# Patient Record
Sex: MALE | Race: WHITE | Employment: FULL TIME | URBAN - NONMETROPOLITAN AREA
[De-identification: names, ages, dates, MRNs, and addresses within clinical notes are randomized per-mention and may not be internally consistent; named-entity substitution may affect disease eponyms.]

---

## 2020-09-13 ENCOUNTER — HOSPITAL ENCOUNTER (EMERGENCY)
Age: 45
Discharge: ANOTHER HOSPITAL | End: 2020-09-13
Attending: EMERGENCY MEDICINE
Payer: COMMERCIAL

## 2020-09-13 ENCOUNTER — HOSPITAL ENCOUNTER (OUTPATIENT)
Age: 45
Setting detail: OBSERVATION
Discharge: HOME | End: 2020-09-14
Attending: INTERNAL MEDICINE | Admitting: INTERNAL MEDICINE
Payer: COMMERCIAL

## 2020-09-13 ENCOUNTER — APPOINTMENT (OUTPATIENT)
Dept: GENERAL RADIOLOGY | Age: 45
End: 2020-09-13
Payer: COMMERCIAL

## 2020-09-13 VITALS
BODY MASS INDEX: 29.35 KG/M2 | RESPIRATION RATE: 10 BRPM | HEART RATE: 107 BPM | SYSTOLIC BLOOD PRESSURE: 123 MMHG | OXYGEN SATURATION: 100 % | HEIGHT: 70 IN | TEMPERATURE: 97.8 F | WEIGHT: 205 LBS | DIASTOLIC BLOOD PRESSURE: 76 MMHG

## 2020-09-13 PROBLEM — I20.0 UNSTABLE ANGINA (HCC): Status: ACTIVE | Noted: 2020-09-13

## 2020-09-13 LAB
A/G RATIO: 2 (ref 1.1–2.2)
ALBUMIN SERPL-MCNC: 4.5 G/DL (ref 3.4–5)
ALP BLD-CCNC: 77 U/L (ref 40–129)
ALT SERPL-CCNC: 17 U/L (ref 10–40)
ANION GAP SERPL CALCULATED.3IONS-SCNC: 9 MMOL/L (ref 3–16)
APTT: 30.5 SEC (ref 24.2–36.2)
APTT: 41.7 SEC (ref 24.2–36.2)
APTT: 45.9 SEC (ref 24.2–36.2)
APTT: 56.6 SEC (ref 24.2–36.2)
AST SERPL-CCNC: 18 U/L (ref 15–37)
BASOPHILS ABSOLUTE: 0.1 K/UL (ref 0–0.2)
BASOPHILS RELATIVE PERCENT: 0.6 %
BILIRUB SERPL-MCNC: 0.4 MG/DL (ref 0–1)
BUN BLDV-MCNC: 14 MG/DL (ref 7–20)
CALCIUM SERPL-MCNC: 8.9 MG/DL (ref 8.3–10.6)
CHLORIDE BLD-SCNC: 106 MMOL/L (ref 99–110)
CO2: 27 MMOL/L (ref 21–32)
CREAT SERPL-MCNC: 0.9 MG/DL (ref 0.9–1.3)
EKG ATRIAL RATE: 81 BPM
EKG ATRIAL RATE: 81 BPM
EKG ATRIAL RATE: 85 BPM
EKG DIAGNOSIS: NORMAL
EKG P AXIS: 29 DEGREES
EKG P AXIS: 31 DEGREES
EKG P AXIS: 60 DEGREES
EKG P-R INTERVAL: 144 MS
EKG P-R INTERVAL: 150 MS
EKG P-R INTERVAL: 158 MS
EKG Q-T INTERVAL: 376 MS
EKG Q-T INTERVAL: 384 MS
EKG Q-T INTERVAL: 392 MS
EKG QRS DURATION: 92 MS
EKG QRS DURATION: 94 MS
EKG QRS DURATION: 98 MS
EKG QTC CALCULATION (BAZETT): 436 MS
EKG QTC CALCULATION (BAZETT): 446 MS
EKG QTC CALCULATION (BAZETT): 466 MS
EKG R AXIS: 54 DEGREES
EKG R AXIS: 56 DEGREES
EKG R AXIS: 62 DEGREES
EKG T AXIS: 49 DEGREES
EKG T AXIS: 51 DEGREES
EKG T AXIS: 53 DEGREES
EKG VENTRICULAR RATE: 81 BPM
EKG VENTRICULAR RATE: 81 BPM
EKG VENTRICULAR RATE: 85 BPM
EOSINOPHILS ABSOLUTE: 0.2 K/UL (ref 0–0.6)
EOSINOPHILS RELATIVE PERCENT: 2.3 %
GFR AFRICAN AMERICAN: >60
GFR NON-AFRICAN AMERICAN: >60
GLOBULIN: 2.3 G/DL
GLUCOSE BLD-MCNC: 108 MG/DL (ref 70–99)
GLUCOSE BLD-MCNC: 97 MG/DL (ref 70–99)
HCT VFR BLD CALC: 44.5 % (ref 40.5–52.5)
HEMOGLOBIN: 14.9 G/DL (ref 13.5–17.5)
LYMPHOCYTES ABSOLUTE: 2.4 K/UL (ref 1–5.1)
LYMPHOCYTES RELATIVE PERCENT: 27.2 %
MCH RBC QN AUTO: 29.3 PG (ref 26–34)
MCHC RBC AUTO-ENTMCNC: 33.6 G/DL (ref 31–36)
MCV RBC AUTO: 87.3 FL (ref 80–100)
MONOCYTES ABSOLUTE: 0.7 K/UL (ref 0–1.3)
MONOCYTES RELATIVE PERCENT: 8.5 %
NEUTROPHILS ABSOLUTE: 5.4 K/UL (ref 1.7–7.7)
NEUTROPHILS RELATIVE PERCENT: 61.4 %
PDW BLD-RTO: 13.6 % (ref 12.4–15.4)
PERFORMED ON: NORMAL
PLATELET # BLD: 233 K/UL (ref 135–450)
PMV BLD AUTO: 9.2 FL (ref 5–10.5)
POTASSIUM REFLEX MAGNESIUM: 4.2 MMOL/L (ref 3.5–5.1)
RBC # BLD: 5.1 M/UL (ref 4.2–5.9)
SODIUM BLD-SCNC: 142 MMOL/L (ref 136–145)
TOTAL PROTEIN: 6.8 G/DL (ref 6.4–8.2)
TROPONIN: <0.01 NG/ML
WBC # BLD: 8.8 K/UL (ref 4–11)

## 2020-09-13 PROCEDURE — 6360000002 HC RX W HCPCS: Performed by: INTERNAL MEDICINE

## 2020-09-13 PROCEDURE — 80053 COMPREHEN METABOLIC PANEL: CPT

## 2020-09-13 PROCEDURE — 99291 CRITICAL CARE FIRST HOUR: CPT

## 2020-09-13 PROCEDURE — 6370000000 HC RX 637 (ALT 250 FOR IP): Performed by: EMERGENCY MEDICINE

## 2020-09-13 PROCEDURE — 93010 ELECTROCARDIOGRAM REPORT: CPT | Performed by: INTERNAL MEDICINE

## 2020-09-13 PROCEDURE — 36415 COLL VENOUS BLD VENIPUNCTURE: CPT

## 2020-09-13 PROCEDURE — 99223 1ST HOSP IP/OBS HIGH 75: CPT | Performed by: INTERNAL MEDICINE

## 2020-09-13 PROCEDURE — 84484 ASSAY OF TROPONIN QUANT: CPT

## 2020-09-13 PROCEDURE — 2580000003 HC RX 258: Performed by: EMERGENCY MEDICINE

## 2020-09-13 PROCEDURE — 85730 THROMBOPLASTIN TIME PARTIAL: CPT

## 2020-09-13 PROCEDURE — 2060000000 HC ICU INTERMEDIATE R&B

## 2020-09-13 PROCEDURE — 85025 COMPLETE CBC W/AUTO DIFF WBC: CPT

## 2020-09-13 PROCEDURE — 2500000003 HC RX 250 WO HCPCS: Performed by: INTERNAL MEDICINE

## 2020-09-13 PROCEDURE — 6370000000 HC RX 637 (ALT 250 FOR IP): Performed by: INTERNAL MEDICINE

## 2020-09-13 PROCEDURE — 96374 THER/PROPH/DIAG INJ IV PUSH: CPT

## 2020-09-13 PROCEDURE — 71045 X-RAY EXAM CHEST 1 VIEW: CPT

## 2020-09-13 PROCEDURE — 6360000002 HC RX W HCPCS: Performed by: EMERGENCY MEDICINE

## 2020-09-13 PROCEDURE — G0378 HOSPITAL OBSERVATION PER HR: HCPCS

## 2020-09-13 PROCEDURE — 2580000003 HC RX 258: Performed by: INTERNAL MEDICINE

## 2020-09-13 PROCEDURE — G0379 DIRECT REFER HOSPITAL OBSERV: HCPCS

## 2020-09-13 PROCEDURE — 93005 ELECTROCARDIOGRAM TRACING: CPT | Performed by: EMERGENCY MEDICINE

## 2020-09-13 PROCEDURE — 84443 ASSAY THYROID STIM HORMONE: CPT

## 2020-09-13 RX ORDER — HEPARIN SODIUM 10000 [USP'U]/100ML
11.3 INJECTION, SOLUTION INTRAVENOUS CONTINUOUS
Status: DISCONTINUED | OUTPATIENT
Start: 2020-09-13 | End: 2020-09-14

## 2020-09-13 RX ORDER — HEPARIN SODIUM 1000 [USP'U]/ML
4000 INJECTION, SOLUTION INTRAVENOUS; SUBCUTANEOUS ONCE
Status: COMPLETED | OUTPATIENT
Start: 2020-09-13 | End: 2020-09-13

## 2020-09-13 RX ORDER — ONDANSETRON 2 MG/ML
4 INJECTION INTRAMUSCULAR; INTRAVENOUS EVERY 6 HOURS PRN
Status: DISCONTINUED | OUTPATIENT
Start: 2020-09-13 | End: 2020-09-14 | Stop reason: HOSPADM

## 2020-09-13 RX ORDER — ASPIRIN 81 MG/1
324 TABLET, CHEWABLE ORAL ONCE
Status: COMPLETED | OUTPATIENT
Start: 2020-09-13 | End: 2020-09-13

## 2020-09-13 RX ORDER — HEPARIN SODIUM 1000 [USP'U]/ML
2000 INJECTION, SOLUTION INTRAVENOUS; SUBCUTANEOUS PRN
Status: DISCONTINUED | OUTPATIENT
Start: 2020-09-13 | End: 2020-09-13 | Stop reason: HOSPADM

## 2020-09-13 RX ORDER — ACETAMINOPHEN 650 MG/1
650 SUPPOSITORY RECTAL EVERY 6 HOURS PRN
Status: DISCONTINUED | OUTPATIENT
Start: 2020-09-13 | End: 2020-09-14 | Stop reason: HOSPADM

## 2020-09-13 RX ORDER — ASPIRIN 81 MG/1
81 TABLET, CHEWABLE ORAL DAILY
Status: DISCONTINUED | OUTPATIENT
Start: 2020-09-14 | End: 2020-09-14

## 2020-09-13 RX ORDER — ATORVASTATIN CALCIUM 40 MG/1
40 TABLET, FILM COATED ORAL NIGHTLY
Status: DISCONTINUED | OUTPATIENT
Start: 2020-09-13 | End: 2020-09-14

## 2020-09-13 RX ORDER — NITROGLYCERIN 0.4 MG/1
0.4 TABLET SUBLINGUAL ONCE
Status: COMPLETED | OUTPATIENT
Start: 2020-09-13 | End: 2020-09-13

## 2020-09-13 RX ORDER — PANTOPRAZOLE SODIUM 40 MG/1
40 TABLET, DELAYED RELEASE ORAL
Status: DISCONTINUED | OUTPATIENT
Start: 2020-09-14 | End: 2020-09-13

## 2020-09-13 RX ORDER — POLYETHYLENE GLYCOL 3350 17 G/17G
17 POWDER, FOR SOLUTION ORAL DAILY PRN
Status: DISCONTINUED | OUTPATIENT
Start: 2020-09-13 | End: 2020-09-14 | Stop reason: HOSPADM

## 2020-09-13 RX ORDER — HEPARIN SODIUM 1000 [USP'U]/ML
2000 INJECTION, SOLUTION INTRAVENOUS; SUBCUTANEOUS PRN
Status: DISCONTINUED | OUTPATIENT
Start: 2020-09-13 | End: 2020-09-14

## 2020-09-13 RX ORDER — PANTOPRAZOLE SODIUM 40 MG/1
40 TABLET, DELAYED RELEASE ORAL
Status: DISCONTINUED | OUTPATIENT
Start: 2020-09-13 | End: 2020-09-14 | Stop reason: HOSPADM

## 2020-09-13 RX ORDER — HEPARIN SODIUM 10000 [USP'U]/100ML
9.7 INJECTION, SOLUTION INTRAVENOUS CONTINUOUS
Status: DISCONTINUED | OUTPATIENT
Start: 2020-09-13 | End: 2020-09-13 | Stop reason: HOSPADM

## 2020-09-13 RX ORDER — PROMETHAZINE HYDROCHLORIDE 25 MG/1
12.5 TABLET ORAL EVERY 6 HOURS PRN
Status: DISCONTINUED | OUTPATIENT
Start: 2020-09-13 | End: 2020-09-14 | Stop reason: HOSPADM

## 2020-09-13 RX ORDER — HEPARIN SODIUM 1000 [USP'U]/ML
4000 INJECTION, SOLUTION INTRAVENOUS; SUBCUTANEOUS PRN
Status: DISCONTINUED | OUTPATIENT
Start: 2020-09-13 | End: 2020-09-13 | Stop reason: HOSPADM

## 2020-09-13 RX ORDER — HEPARIN SODIUM 1000 [USP'U]/ML
4000 INJECTION, SOLUTION INTRAVENOUS; SUBCUTANEOUS PRN
Status: DISCONTINUED | OUTPATIENT
Start: 2020-09-13 | End: 2020-09-14

## 2020-09-13 RX ORDER — SUCRALFATE 1 G/1
1 TABLET ORAL EVERY 6 HOURS SCHEDULED
Status: DISCONTINUED | OUTPATIENT
Start: 2020-09-13 | End: 2020-09-14 | Stop reason: HOSPADM

## 2020-09-13 RX ORDER — 0.9 % SODIUM CHLORIDE 0.9 %
500 INTRAVENOUS SOLUTION INTRAVENOUS ONCE
Status: COMPLETED | OUTPATIENT
Start: 2020-09-13 | End: 2020-09-13

## 2020-09-13 RX ORDER — SODIUM CHLORIDE 0.9 % (FLUSH) 0.9 %
10 SYRINGE (ML) INJECTION PRN
Status: DISCONTINUED | OUTPATIENT
Start: 2020-09-13 | End: 2020-09-14 | Stop reason: HOSPADM

## 2020-09-13 RX ORDER — NITROGLYCERIN 0.4 MG/1
0.4 TABLET SUBLINGUAL EVERY 5 MIN PRN
Status: DISCONTINUED | OUTPATIENT
Start: 2020-09-13 | End: 2020-09-14 | Stop reason: HOSPADM

## 2020-09-13 RX ORDER — HEPARIN SODIUM 1000 [USP'U]/ML
2000 INJECTION, SOLUTION INTRAVENOUS; SUBCUTANEOUS ONCE
Status: COMPLETED | OUTPATIENT
Start: 2020-09-13 | End: 2020-09-13

## 2020-09-13 RX ORDER — ACETAMINOPHEN 325 MG/1
650 TABLET ORAL EVERY 6 HOURS PRN
Status: DISCONTINUED | OUTPATIENT
Start: 2020-09-13 | End: 2020-09-14 | Stop reason: HOSPADM

## 2020-09-13 RX ORDER — SODIUM CHLORIDE 0.9 % (FLUSH) 0.9 %
10 SYRINGE (ML) INJECTION EVERY 12 HOURS SCHEDULED
Status: DISCONTINUED | OUTPATIENT
Start: 2020-09-13 | End: 2020-09-14 | Stop reason: HOSPADM

## 2020-09-13 RX ADMIN — SODIUM CHLORIDE, PRESERVATIVE FREE 10 ML: 5 INJECTION INTRAVENOUS at 20:38

## 2020-09-13 RX ADMIN — SUCRALFATE 1 G: 1 TABLET ORAL at 14:08

## 2020-09-13 RX ADMIN — PANTOPRAZOLE SODIUM 40 MG: 40 TABLET, DELAYED RELEASE ORAL at 14:08

## 2020-09-13 RX ADMIN — ATORVASTATIN CALCIUM 40 MG: 40 TABLET, FILM COATED ORAL at 20:38

## 2020-09-13 RX ADMIN — ASPIRIN 324 MG: 81 TABLET, CHEWABLE ORAL at 06:10

## 2020-09-13 RX ADMIN — NITROGLYCERIN 0.4 MG: 0.4 TABLET SUBLINGUAL at 06:10

## 2020-09-13 RX ADMIN — LIDOCAINE HYDROCHLORIDE: 20 SOLUTION ORAL; TOPICAL at 14:08

## 2020-09-13 RX ADMIN — SUCRALFATE 1 G: 1 TABLET ORAL at 23:30

## 2020-09-13 RX ADMIN — SUCRALFATE 1 G: 1 TABLET ORAL at 17:44

## 2020-09-13 RX ADMIN — HEPARIN SODIUM 4000 UNITS: 1000 INJECTION INTRAVENOUS; SUBCUTANEOUS at 07:48

## 2020-09-13 RX ADMIN — SODIUM CHLORIDE 500 ML: 9 INJECTION, SOLUTION INTRAVENOUS at 07:15

## 2020-09-13 RX ADMIN — HEPARIN SODIUM 9.7 ML/HR: 10000 INJECTION, SOLUTION INTRAVENOUS at 07:49

## 2020-09-13 RX ADMIN — HEPARIN SODIUM 2000 UNITS: 1000 INJECTION INTRAVENOUS; SUBCUTANEOUS at 16:39

## 2020-09-13 RX ADMIN — HEPARIN SODIUM 9.7 ML/HR: 10000 INJECTION, SOLUTION INTRAVENOUS at 10:51

## 2020-09-13 ASSESSMENT — ENCOUNTER SYMPTOMS
SORE THROAT: 0
NAUSEA: 0
ABDOMINAL PAIN: 0
BACK PAIN: 0
VOMITING: 0
EYE DISCHARGE: 0
SHORTNESS OF BREATH: 1
DIARRHEA: 0
COUGH: 0
CHEST TIGHTNESS: 1

## 2020-09-13 ASSESSMENT — PAIN DESCRIPTION - PAIN TYPE: TYPE: ACUTE PAIN

## 2020-09-13 ASSESSMENT — PAIN DESCRIPTION - ORIENTATION: ORIENTATION: MID

## 2020-09-13 ASSESSMENT — PAIN SCALES - GENERAL
PAINLEVEL_OUTOF10: 1
PAINLEVEL_OUTOF10: 0

## 2020-09-13 ASSESSMENT — PAIN DESCRIPTION - LOCATION: LOCATION: CHEST

## 2020-09-13 ASSESSMENT — HEART SCORE: ECG: 2

## 2020-09-13 ASSESSMENT — PAIN DESCRIPTION - DESCRIPTORS: DESCRIPTORS: OTHER (COMMENT)

## 2020-09-13 NOTE — ED TRIAGE NOTES
Pt states last weekend he was having mid chest pain that he felt like was effecting his breathing. When he would sit and rest the feeling would go away. Today he woke up with more mid chest pain and feeling like his forehead was hot.

## 2020-09-13 NOTE — H&P
Hospital Medicine History & Physical      PCP: Damian Hu MD    Date of Admission: 2020    Date of Service: Pt seen/examined on 20  and Admitted to Inpatient with expected LOS greater than two midnights due to medical therapy. Chief Complaint:   Directly admitted from [facility] ED for further evaluation of chest tightness, shortness of breath on exertion    History Of Present Illness:   54 y.o. male with no significant PMH except for GERD  who presented to [facility] ED  with C/o chest tightness associated with mild shortness of breath on exertion. Patient reports that his symptoms has been intermittently ongoing for the past 1 week. He described chest tightness as squeezing sensation, nonradiating, no aggravating or relieving factors. Denies smoking, drinking, illicit drug use. Reports mom has history of cardiomegaly and grandfather  of heart attack. Reports that he does not exercise as much as he wants. He went to [facility]  ED for further evaluation and management. [facility] ED Course : Patient hemodynamically stable upon arrival to ED. His initial troponin negative and EKG showed mild T wave inversions in V1 to V3 and ST depressions in V2 V3. Cardiology was consulted from ED. While in ED, patient had a syncopal event with bradycardia (reported heart rate in 20s) . Patient reports that he was nervous after receiving saline flush, which could have contributed to that. Reports that he  had  similar event in the past.  Patient started on heparin gtt. Cardiology consulted and was recommended admission for further evaluation and management. [facility] hospitalists consulted for admission. Upon evaluation by me at [facility], patient accompanied with his significant other at bedside. Reports mild chest pressure-like sensation at one point near the left nipple. Denied any other symptoms. Reported that his troponin trend has been negative.     Past Medical texture, turgor normal.  No rashes or lesions. Neurologic:  Neurovascularly intact without any focal sensory/motor deficits. Cranial nerves: II-XII intact, grossly non-focal.  Psychiatric:  Alert and oriented, thought content appropriate, normal insight  Capillary Refill: Brisk,< 3 seconds   Peripheral Pulses: +2 palpable, equal bilaterally     Labs:   Recent Labs     09/13/20 0605   WBC 8.8   HGB 14.9   HCT 44.5        Recent Labs     09/13/20 0605      K 4.2      CO2 27   BUN 14   CREATININE 0.9   CALCIUM 8.9     Recent Labs     09/13/20 0605   AST 18   ALT 17   BILITOT 0.4   ALKPHOS 77     No results for input(s): INR in the last 72 hours. Recent Labs     09/13/20 0605   TROPONINI <0.01     EKG:  I have reviewed the EKG with the following interpretation: Normal sinus rhythm with sinus arrhythmia, normal axis, normal intervals, nonspecific  ST-T wave changes    Radiology:    I have reviewed the Imaging  with the following interpretation:   Chest x-ray  - no Acute abnormality    Active Hospital Problems    Diagnosis Date Noted    Unstable angina ([address] [I20.0] 09/13/2020     ASSESSMENT/PLAN:  1. Chest pain with SOBOE concerning to ED for unstable angina POA  -Received aspirin, nitro PRN, EKG with mild ST depressions in V2 to V3 and T wave inversions from V1 to V3. Cardiology consulted from [facility] ED and patient was  recommended admission for further evaluation and management. Patient initiated on heparin GTT at [facility]  ED prior to transfer. Continue   -Admit to [facility] with telemetry, trend troponin, check FLP, HbA1c.  -NPO after MN . Cardiac diet     2. GERD - On PPI ; Continue    3. Obesity -  With Body mass index is 30.58 kg/m². Complicating assessment and treatment. Placing patient at risk for multiple co-morbidities as well as early death and contributing to the patient's presentation. Counseled on weight loss.      DVT Prophylaxis: Heparin GTT  Diet: Diet NPO Effective Now  Code Status: Full Code    PT/OT Eval Status: Ambulatory    Dispo -anticipate more than 2 midnight stay in the hospital     Morena [facility] MD    The note was completed using Dragon -speech recognition software & EMR  . Every effort was made to ensure accuracy; however, inadvertent computerized transcription errors may be present. Thank you Ernie Powell MD for the opportunity to be involved in this patient's care. If you have any questions or concerns please feel free to contact me at [phone].

## 2020-09-13 NOTE — ED NOTES
Noted dramatic decrease in pt heart rate on monitor NSR to SB 20s. Upon arrival to room pt was unconscious and non-responsive. Several seconds after staff arrived pt started to wake up, unsure of what had happened. Pt moved to room 1, pacer pads placed; HR returned to NSR. Pt stable and responsive at this time, sister at bedside and wife updated via phone. Denies further needs at this time, will continue to monitor. Loreta Leal RN.        Christopher Alvarez RN  09/13/20 0700

## 2020-09-13 NOTE — CONSULTS
Electrophysiology Consultation   Date: 9/13/2020  Admit Date:  9/13/2020  Reason for Consultation: Chest pain  Consult Requesting Physician: Rosalio Espinoza MD     No chief complaint on file. HPI:   Mr. Jarrett Meyer is a pleasant 54year old male with a medical history significant for newly diagnosed hypertension and GERD who presents from home with acute on chronic chest pain now complicated by bradycardia. According patient has been suffering from mid sternal chest pain for the last few months. Over the last few weeks he is noted that the chest discomfort has acutely worsened. He states that standing up and walking around usually tends to make it feel better. He states that laying on his side tends to aggravate it. It does not occur when he is upright and walking around. There is no radiation of his chest discomfort. There is no associated nausea or vomiting or diaphoresis. Because his discomfort was aggressively worsening he went to the emergency room for evaluation. Patient denies fevers, orthopnea, PND, lower extremity edema, abdominal swelling, shortness of breath, dyspnea on exertion, chills, visual changes, headaches, sore throat, cough, abdominal pain, nausea, vomiting, bleeding, bruising, dysuria, muscle/joint pain, confusion, depression, anxiety, skin lesions, etc.    Of note he has no history of syncope prior to his evaluation emergency room. Emergency Room/Hospital Course:  Patient was evaluated in the emergency room. During his work-up he got some sublingual nitroglycerin which did not help his pain and him bolus of saline. Shortly thereafter patient had a syncopal episode. Telemetry showed recurrent pauses longest being approximately 5.2 seconds. His EKG outside of this was normal except for ST inversion and borderline depression in his anterior leads. His troponin enzymes have been negative x3. His CMP was reassuring. His CBC was reassuring.   His chest radiograph is reassuring. Past Medical History:   Diagnosis Date    GERD (gastroesophageal reflux disease)     Perforated appendicitis         Past Surgical History:   Procedure Laterality Date    APPENDECTOMY  3/2/2016    Laparoscopic       Allergies   Allergen Reactions    Pcn [Penicillins] Hives       Social History:  Reviewed. reports that he has never smoked. He has never used smokeless tobacco. He reports that he does not drink alcohol or use drugs. Family History:  Reviewed. family history includes Cancer in his mother; Heart Disease in his mother. No premature CAD. Review of System:  All other systems reviewed except for that noted above. Pertinent negatives and positives are:     · General: negative for fever, chills   · Ophthalmic ROS: negative for - eye pain or loss of vision  · ENT ROS: negative for - headaches, sore throat   · Respiratory: negative for - cough, sputum  · Cardiovascular: Reviewed in HPI  · Gastrointestinal: negative for - abdominal pain, diarrhea, N/V  · Hematology: negative for - bleeding, blood clots, bruising or jaundice  · Genito-Urinary:  negative for - Dysuria or incontinence  · Musculoskeletal: negative for - Joint swelling, muscle pain  · Neurological: negative for - confusion, dizziness, headaches   · Psychiatric: No anxiety, no depression.   · Dermatological: negative for - rash    Physical Examination:  Vitals:    20 1126   BP: 137/87   Pulse: 89   Resp: 16   Temp: 98.5 °F (36.9 °C)   SpO2: 99%        Intake/Output Summary (Last 24 hours) at 2020 1312  Last data filed at 2020 1020  Gross per 24 hour   Intake --   Output 200 ml   Net -200 ml     In: -   Out: 200    Wt Readings from Last 3 Encounters:   20 213 lb 2 oz (96.7 kg)   20 205 lb (93 kg)   18 205 lb (93 kg)     Temp  Av.2 °F (36.8 °C)  Min: 97.8 °F (36.6 °C)  Max: 98.5 °F (36.9 °C)  Pulse  Av.5  Min: 85  Max: 107  BP  Min: 98/63  Max: 152/99  SpO2  Av.4 %  Min: 97 % Max: 100 %    · Telemetry: Sinus rhythm. Bradycardia with syncope noted on strips from ER. · Constitutional: Alert. Oriented to person, place, and time. No distress. · Head: Normocephalic and atraumatic. · Mouth/Throat: Lips appear moist. Oropharynx is clear and moist.   · Cardiovascular: Normal rate, regular rhythm. Normal S1&S2. · Pulmonary/Chest: Bilateral respiratory sounds present. No respiratory accessory muscle use. No wheezes, No rhonchi. · Abdominal: Soft. Normal bowel sounds present. No distension, No tenderness. No splenomegaly. No hernia. · Musculoskeletal: No tenderness. No edema    · Neurological: Alert and oriented. Cranial nerve II-XII grossly intact. · Skin: Skin is warm and dry. No rash, lesions, ulcerations noted. · Psychiatric: No anxiety nor agitation. Labs:  Reviewed. Recent Labs     09/13/20  0605      K 4.2      CO2 27   BUN 14   CREATININE 0.9     Recent Labs     09/13/20  0605   WBC 8.8   HGB 14.9   HCT 44.5   MCV 87.3        Lab Results   Component Value Date    TROPONINI <0.01 09/13/2020     No results found for: BNP  No results found for: PROTIME, INR  No results found for: CHOL, HDL, TRIG    Diagnostic and imaging results reviewed. ECG: Sinus rhythm with TW inversions and borderline depression in anterior leads. I independently reviewed the ECG and telemetry.     Scheduled Meds:   sodium chloride flush  10 mL Intravenous 2 times per day    atorvastatin  40 mg Oral Nightly    [START ON 9/14/2020] aspirin  81 mg Oral Daily    [START ON 9/14/2020] pantoprazole  40 mg Oral QAM AC     Continuous Infusions:   heparin (Porcine) 9.7 mL/hr (09/13/20 1051)     PRN Meds:.sodium chloride flush, acetaminophen **OR** acetaminophen, polyethylene glycol, promethazine **OR** ondansetron, heparin (porcine), heparin (porcine), nitroGLYCERIN     Assessment:   Patient Active Problem List    Diagnosis Date Noted    Unstable angina ([facility] [address] 09/13/2020    S/P laparoscopic appendectomy 03/21/2016    Perforated appendicitis 03/06/2016      Active Hospital Problems    Diagnosis Date Noted    Unstable angina [facility]) [I20.0] 09/13/2020       Mr. Moira Mclain is a pleasant 54year old male with a medical history significant for newly diagnosed hypertension and GERD who presents from home with acute on chronic chest pain now complicated by bradycardia. Problem List:  1. Chest pain. 2. Bradycardia/syncope. Assessment and Plan:  1. Chest pain. Patient is a pleasant 48-year-old male with a medical history significant for hypertension and GERD who presents from home with acute on chronic chest pain that is been worsening over the last few weeks. His troponin enzymes are negative despite continued to have chest discomfort. Nitroglycerin did not help his chest pain. His EKG is somewhat worrisome with ST inversion and borderline depression in his anterior leads. His symptoms are not classic for typical angina. Given the fact that he had a bradycardic event and is low risk profile I think it be reasonable patient have a exercise stress test which also give us some chronotropic competence information. I also think patient benefit from wearing a monitor for 2 weeks given this event.  - NPO at midnight.  - GI cocktail.  - Start sucralfate. - Increase PPI.  - Exercise stress test in am.  - Continue ACS with aspirin and heparin. Will hold off on DAPT as I'm not convinced that his chest pain is coronary artery related. 2. Bradycardia/syncope. Patient had an episode of sinus arrest for approximately 5.2 seconds. This is was symptomatic resulting in syncope. This was also around the time when he just gotten some nitroglycerin and a bolus of saline through his IV. I suspect that this is most likely vagal in nature however it is interesting that there was no P wave slowing prior to the pause.   Given the fact is never happened before I would like to have the patient undergo a exercise stress test to see his chronotropic competency and have him wear a monitor for 2 weeks. - Exercise stress test.  - Two week monitor at time of discharge. Thank you for allowing me to participate in the care of Janet Cancer . If you have any questions/comments, please do not hesitate to contact us.     Tilda Frankel, MD  Cardiac Electrophysiology  [address] [facility]  [phone] [facility]

## 2020-09-13 NOTE — ED PROVIDER NOTES
[address]        Pt Name: Kameron Monroy  MRN: [id number]  [place] 1975  Date of evaluation: 9/13/2020  Provider: Bushra Walters MD  PCP: Ronnie Del Rosario MD  ED Attending: Bushra Walters MD    [address] [place] [address]       Chief Complaint   Patient presents with    Chest Pain       HISTORY OF PRESENT ILLNESS   (Location/Symptom, Timing/Onset, Context/Setting, Quality, Duration, Modifying Factors, Severity)  Note limiting factors. Kameron Monroy is a 54 y.o. male who presents to the emergency department with 1 out of 10 chest pain. He describes it as a squeezing sensation associated with some shortness of breath. Exertion tends to make it worse. Rest does tend to make it better. Patient states it is been off and on for the last week. Last weekend it apparently was even more significant than it is today. He has felt somewhat fatigued throughout the week as well. He also describes a sensation of not getting enough oxygen. His wife was concerned when he woke up this morning with that and so sent him to the emergency department. He does not have any family history of heart disease. No other cardiac risk factors. History is obtained from the patient. REVIEW OF SYSTEMS    (2-9 systems for level 4, 10 or more for level 5)     Review of Systems   Constitutional: Positive for fatigue. Negative for chills and fever. HENT: Negative for congestion and sore throat. Eyes: Negative for discharge. Respiratory: Positive for chest tightness and shortness of breath. Negative for cough. Cardiovascular: Positive for chest pain. Gastrointestinal: Negative for abdominal pain, diarrhea, nausea and vomiting. Endocrine: Negative for polydipsia. Genitourinary: Negative for dysuria and urgency. Musculoskeletal: Negative for back pain and myalgias. Skin: Negative for rash. Neurological: Negative for weakness and headaches. Hematological: Does not bruise/bleed easily. Psychiatric/Behavioral: Negative for confusion. Positives and Pertinent negatives as per HPI. Except as noted above in the ROS, all other systems were reviewed and negative.        PAST MEDICAL HISTORY     Past Medical History:   Diagnosis Date    GERD (gastroesophageal reflux disease)     Perforated appendicitis          SURGICAL HISTORY     Past Surgical History:   Procedure Laterality Date    APPENDECTOMY  3/2/2016    Laparoscopic         CURRENTMEDICATIONS       Discharge Medication List as of 9/13/2020  8:57 AM      CONTINUE these medications which have NOT CHANGED    Details   omeprazole (PRILOSEC) 20 MG capsule Take 20 mg by mouth daily      naproxen (NAPROSYN) 500 MG tablet Take 1 tablet by mouth 2 times daily for 10 days, Disp-20 tablet, R-0Print               ALLERGIES     Pcn [penicillins]    FAMILYHISTORY       Family History   Problem Relation Age of Onset    Cancer Mother     Heart Disease Mother           SOCIAL HISTORY       Social History     Socioeconomic History    Marital status:      Spouse name: None    Number of children: None    Years of education: None    Highest education level: None   Occupational History    None   Social Needs    Financial resource strain: None    Food insecurity     Worry: None     Inability: None    Transportation needs     Medical: None     Non-medical: None   Tobacco Use    Smoking status: Never Smoker    Smokeless tobacco: Never Used   Substance and Sexual Activity    Alcohol use: No    Drug use: No    Sexual activity: Yes     Partners: Female   Lifestyle    Physical activity     Days per week: None     Minutes per session: None    Stress: None   Relationships    Social connections     Talks on phone: None     Gets together: None     Attends Mandaeism service: None     Active member of club or organization: None     Attends meetings of clubs or organizations: None     Relationship status: None    Intimate partner violence     Fear of current or ex partner: None     Emotionally abused: None     Physically abused: None     Forced sexual activity: None   Other Topics Concern    None   Social History Narrative    None       SCREENINGS    [place] Coma Scale  Eye Opening: Spontaneous  Best Verbal Response: Oriented  Best Motor Response: Obeys commands  [place]i Coma Scale Score: 15 Heart Score for chest pain patients  History: Moderately Suspicious  ECG: Significant ST-deviation  Patient Age: < 45 years  Risk Factors: No risk factors known  Troponin: < 1X normal limit  Heart Score Total: 3      PHYSICAL EXAM    (up to 7 for level 4, 8 or more for level 5)     ED Triage Vitals [09/13/20 0600]   BP Temp Temp Source Pulse Resp SpO2 Height Weight   (!) 152/99 97.8 °F (36.6 °C) Oral 87 16 -- 5' 10\" (1.778 m) 205 lb (93 kg)       Physical Exam  Vitals signs and nursing note reviewed. Constitutional:       General: He is not in acute distress. Appearance: He is well-developed. HENT:      Head: Normocephalic and atraumatic. Right Ear: External ear normal.      Left Ear: External ear normal.      Nose: Nose normal.      Mouth/Throat:      Pharynx: No oropharyngeal exudate. Eyes:      Conjunctiva/sclera: Conjunctivae normal.      Pupils: Pupils are equal, round, and reactive to light. Neck:      Musculoskeletal: Normal range of motion and neck supple. Cardiovascular:      Rate and Rhythm: Normal rate and regular rhythm. Heart sounds: Normal heart sounds. No murmur. No friction rub. No gallop. Pulmonary:      Effort: Pulmonary effort is normal. No respiratory distress. Breath sounds: Normal breath sounds. No wheezing. Abdominal:      General: Bowel sounds are normal. There is no distension. Palpations: Abdomen is soft. Tenderness: There is no abdominal tenderness. There is no guarding or rebound. Musculoskeletal: Normal range of motion.          General: No tenderness. Lymphadenopathy:      Cervical: No cervical adenopathy. Skin:     General: Skin is warm and dry. Capillary Refill: Capillary refill takes less than 2 seconds. Findings: No rash. Neurological:      Mental Status: He is alert and oriented to person, place, and time. Cranial Nerves: No cranial nerve deficit.          DIAGNOSTIC RESULTS   LABS:    Results for orders placed or performed during the hospital encounter of 09/13/20   CBC Auto Differential   Result Value Ref Range    WBC 8.8 4.0 - 11.0 K/uL    RBC 5.10 4.20 - 5.90 M/uL    Hemoglobin 14.9 13.5 - 17.5 g/dL    Hematocrit 44.5 40.5 - 52.5 %    MCV 87.3 80.0 - 100.0 fL    MCH 29.3 26.0 - 34.0 pg    MCHC 33.6 31.0 - 36.0 g/dL    RDW 13.6 12.4 - 15.4 %    Platelets 025 363 - 423 K/uL    MPV 9.2 5.0 - 10.5 fL    Neutrophils % 61.4 %    Lymphocytes % 27.2 %    Monocytes % 8.5 %    Eosinophils % 2.3 %    Basophils % 0.6 %    Neutrophils Absolute 5.4 1.7 - 7.7 K/uL    Lymphocytes Absolute 2.4 1.0 - 5.1 K/uL    Monocytes Absolute 0.7 0.0 - 1.3 K/uL    Eosinophils Absolute 0.2 0.0 - 0.6 K/uL    Basophils Absolute 0.1 0.0 - 0.2 K/uL   Comprehensive Metabolic Panel w/ Reflex to MG   Result Value Ref Range    Sodium 142 136 - 145 mmol/L    Potassium reflex Magnesium 4.2 3.5 - 5.1 mmol/L    Chloride 106 99 - 110 mmol/L    CO2 27 21 - 32 mmol/L    Anion Gap 9 3 - 16    Glucose 108 (H) 70 - 99 mg/dL    BUN 14 7 - 20 mg/dL    CREATININE 0.9 0.9 - 1.3 mg/dL    GFR Non-African American >60 >60    GFR African American >60 >60    Calcium 8.9 8.3 - 10.6 mg/dL    Total Protein 6.8 6.4 - 8.2 g/dL    Alb 4.5 3.4 - 5.0 g/dL    Albumin/Globulin Ratio 2.0 1.1 - 2.2    Total Bilirubin 0.4 0.0 - 1.0 mg/dL    Alkaline Phosphatase 77 40 - 129 U/L    ALT 17 10 - 40 U/L    AST 18 15 - 37 U/L    Globulin 2.3 g/dL   Troponin   Result Value Ref Range    Troponin <0.01 <0.01 ng/mL   APTT   Result Value Ref Range    aPTT 30.5 24.2 - 36.2 sec   EKG 12 Lead   Result Value Ref Range    Ventricular Rate 85 BPM    Atrial Rate 85 BPM    P-R Interval 158 ms    QRS Duration 98 ms    Q-T Interval 392 ms    QTc Calculation (Bazett) 466 ms    P Axis 29 degrees    R Axis 54 degrees    T Axis 53 degrees    Diagnosis       Normal sinus rhythmST & T wave abnormality, consider anterior ischemiaProlonged QTIncomplete right bundle branch blockNo previous ECGs availableConfirmed by JOSY Middleton MD ([id number]) on 9/13/2020 12:19:38 PM   EKG 12 Lead   Result Value Ref Range    Ventricular Rate 81 BPM    Atrial Rate 81 BPM    P-R Interval 150 ms    QRS Duration 94 ms    Q-T Interval 376 ms    QTc Calculation (Bazett) 436 ms    P Axis 31 degrees    R Axis 56 degrees    T Axis 51 degrees    Diagnosis       Normal sinus rhythmIncomplete right bundle branch blockNonspecific ST and T wave abnormalityAbnormal ECGWhen compared with ECG of 13-SEP-2020 06:03, (unconfirmed)No significant change was foundConfirmed by Philip Middleton MD ([id number]) on 9/13/2020 12:20:08 PM   EKG 12 Lead   Result Value Ref Range    Ventricular Rate 81 BPM    Atrial Rate 81 BPM    P-R Interval 144 ms    QRS Duration 92 ms    Q-T Interval 384 ms    QTc Calculation (Bazett) 446 ms    P Axis 60 degrees    R Axis 62 degrees    T Axis 49 degrees    Diagnosis       Normal sinus rhythm with sinus arrhythmiaNonspecific ST and T wave abnormalityIncomplete right bundle branch blockWhen compared with ECG of 13-SEP-2020 06:09, (unconfirmed)No significant change was foundConfirmed by Philip Middleton MD ([id number]) on 9/13/2020 12:20:21 PM       All other labs were within normal range ornot returned as of this dictation. EKG: All EKG's are interpreted by the Emergency Department Physician who either signs or Co-signs this chart in the absence of a cardiologist.  Please see their note for interpretation of EKG.     EKG Interpretation at [place] [address]    Interpreted by emergency department physician    Rhythm: normal sinus   Rate: normal  Axis: normal  Ectopy: none  Conduction: normal  ST Segments: depression in  v2 and v3  T Waves: inversion in  v1, v2 and v3  Q Waves: none    Clinical Impression: Normal sinus rhythm, ST and T wave changes concerning for possible anterior ischemia. Borderline prolonged QTC of 466 ms. No priors for comparison. EKG Interpretation (POSTERIOR) at 0610    Interpreted by emergency department physician    Rhythm: normal sinus   Rate: normal  Axis: normal  Ectopy: none  Conduction: normal  ST Segments: depression in  V2, V7  T Waves: inversion in  V1, V2, V7  Q Waves: none    Clinical Impression: normal sinus rhythm, ST and T wave changes concerning for anterior ischemia. EKG Interpretation at 4446    Interpreted by emergency department physician    Rhythm: normal sinus  and sinus arrhythmia  Rate: normal  Axis: normal  Ectopy: none  Conduction: normal  ST Segments: depression in  v2 and v3  T Waves: inversion in  v1, v2 and v3  Q Waves: none    Clinical Impression: normal sinus rhythm, sinus arrhythmia, ST and T wave changes concerning for anterior ischemia. Unchanged from EKG at 0604. RADIOLOGY:   Non-plain film images such as CT, Ultrasound and MRI are read by the radiologist.  Plain radiographic images are visualized and preliminarily interpreted by the ED Provider with the belowfindings:    Interpretation per the Radiologist below, if available at the time of this note:    XR CHEST PORTABLE   Final Result   No acute cardiopulmonary disease. PROCEDURES   Unless otherwise noted below, none     Procedures    CRITICAL CARE TIME   Total critical care time today provided was 48 minutes. This excludes seperately billable procedures and family discussion time. Provided for acute unstable angina, acute symptomatic bradycardia, acute EKG changes requiring intervention with concern for potential decompensation.       CONSULTS:  PHARMACY TO DOSE MEDICATION      EMERGENCY DEPARTMENT COURSE and DIFFERENTIAL DIAGNOSIS/MDM: Vitals:    Vitals:    09/13/20 0815 09/13/20 0830 09/13/20 0845 09/13/20 0853   BP: 123/88 128/77 123/81 123/76   Pulse: 105 94 87 107   Resp: 10 16 13 10   Temp:       TempSrc:       SpO2: 100% 100% 99% 100%   Weight:       Height:           Patient was given the following medications:  Medications   aspirin chewable tablet 324 mg (324 mg Oral Given 9/13/20 0610)   nitroGLYCERIN (NITROSTAT) SL tablet 0.4 mg (0.4 mg Sublingual Given 9/13/20 0610)   0.9 % sodium chloride bolus (0 mLs Intravenous Stopped 9/13/20 0853)   heparin (porcine) injection 4,000 Units (4,000 Units Intravenous Given 9/13/20 0748)       ED Course as of Sep 14 0148   Sun Sep 13, 2020   0644 Patient had a serious event occur. His heart rate dropped on telemetry from the 80's to the 20's in the span of about 30 seconds. We rushed into the room to find him ashen gray, unresponsive with a very bradycardic pulse. When we put him in [place], the patient's color returned and his heart rate rapidly improved as well. Patient moved to room 1. Additional EKG obtained that does not show changes. Case discussed emergently with on call interventional cardiology Dr. Isai Khan. He wants the patient transferred to [facility] via the hospitalists. [TC]   0701 Updated the patient and his sister. Patient feels fine right now, just shaken up. [facility] Dr. Bobo Gilbert has accepted the patient. We agreed to start heparin given the ST changes. [TC]      ED Course User Index  [TC] Pratima Wiseman MD     Patient is agreeable with the plan to transfer. Differential diagnosis included but was not limited to: acute coronary syndrome, pulmonary embolism, COPD/asthma, pneumonia, musculoskeletal, reflux/PUD/gastritis, pneumothorax, CHF, thoracic aortic dissection, anxiety, malignant dysrhythmia. FINAL IMPRESSION      1. Unstable angina ([address]    2. Symptomatic bradycardia    3.  Abnormal EKG          DISPOSITION/PLAN   DISPOSITION Decision To Transfer 09/13/2020 06:40:31 AM    (Please note that portions of this note were completed with a voice recognition program.  Efforts were made to edit the dictations but occasionally words are mis-transcribed.)    Windy Costa MD(electronically signed)              Windy Costa MD  09/14/20 8671

## 2020-09-13 NOTE — ED NOTES
Report given to [facility]. VSS. Heparin infusing per order. Wife at bedside. defib pads in place. Patient being transferred to [facility] at this time.       Camila Frank RN  09/13/20 7183

## 2020-09-13 NOTE — CONSULTS
Pharmacy to Manage Heparin Infusion per [facility]    Dx: ACS  Pt ABW = 81.1 Kg  Baseline aPTT = 30.5 sec    Low Dose Heparin Infusion  Transferred from [facility], continue with heparin rate of 9.7 ml/hr  Goal aPTT = 49-76 seconds. 9/13 1427  APTT = 41.7 sec  Heparin 2000 units bolus and increase heparin rate to 11.3 ml/hr  Recheck aPTT in 6 hours at [address]  9/13/2020 at 4:31 PM    9/13  aPTT = 56.6 sec at 2302. Continue heparin gtt at 11.3 mL/hr. Recheck aPTT in 6 hours. Merry Noble, [place]  9/13/2020 11:45 PM    9/14  aPTT = 54.6 sec at 0436. Continue heparin gtt at 5.6 mL/hr. Check aPTT daily starting 9/15.   Merry Noble PharmD  9/14/2020 6:00 AM

## 2020-09-13 NOTE — PLAN OF CARE
Problem: Falls - Risk of:  Goal: Will remain free from falls  Description: Will remain free from falls  Outcome: Ongoing  Goal: Absence of physical injury  Description: Absence of physical injury  Outcome: Ongoing   Patient instructed to use call light if assistance is needed. Call light within reach. Bed locked and in lowest position. Wife at bedside.

## 2020-09-14 ENCOUNTER — APPOINTMENT (OUTPATIENT)
Dept: NUCLEAR MEDICINE | Age: 45
End: 2020-09-14
Attending: INTERNAL MEDICINE
Payer: COMMERCIAL

## 2020-09-14 VITALS
HEART RATE: 88 BPM | DIASTOLIC BLOOD PRESSURE: 80 MMHG | BODY MASS INDEX: 30.25 KG/M2 | OXYGEN SATURATION: 99 % | TEMPERATURE: 98.8 F | SYSTOLIC BLOOD PRESSURE: 118 MMHG | RESPIRATION RATE: 18 BRPM | WEIGHT: 211.3 LBS | HEIGHT: 70 IN

## 2020-09-14 PROBLEM — R00.1 BRADYCARDIA: Status: ACTIVE | Noted: 2020-09-14

## 2020-09-14 PROBLEM — K21.9 GERD (GASTROESOPHAGEAL REFLUX DISEASE): Status: ACTIVE | Noted: 2020-09-14

## 2020-09-14 PROBLEM — R55 SYNCOPE: Status: ACTIVE | Noted: 2020-09-14

## 2020-09-14 PROBLEM — E66.9 OBESITY: Status: ACTIVE | Noted: 2020-09-14

## 2020-09-14 LAB
ANION GAP SERPL CALCULATED.3IONS-SCNC: 11 MMOL/L (ref 3–16)
APTT: 54.6 SEC (ref 24.2–36.2)
BUN BLDV-MCNC: 14 MG/DL (ref 7–20)
CALCIUM SERPL-MCNC: 8.8 MG/DL (ref 8.3–10.6)
CHLORIDE BLD-SCNC: 107 MMOL/L (ref 99–110)
CHOLESTEROL, TOTAL: 151 MG/DL (ref 0–199)
CO2: 23 MMOL/L (ref 21–32)
CREAT SERPL-MCNC: 0.7 MG/DL (ref 0.9–1.3)
EKG ATRIAL RATE: 89 BPM
EKG DIAGNOSIS: NORMAL
EKG P AXIS: 43 DEGREES
EKG P-R INTERVAL: 154 MS
EKG Q-T INTERVAL: 388 MS
EKG QRS DURATION: 98 MS
EKG QTC CALCULATION (BAZETT): 472 MS
EKG R AXIS: 37 DEGREES
EKG T AXIS: 39 DEGREES
EKG VENTRICULAR RATE: 89 BPM
ESTIMATED AVERAGE GLUCOSE: 128.4 MG/DL
GFR AFRICAN AMERICAN: >60
GFR NON-AFRICAN AMERICAN: >60
GLUCOSE BLD-MCNC: 117 MG/DL (ref 70–99)
HBA1C MFR BLD: 6.1 %
HCT VFR BLD CALC: 42.9 % (ref 40.5–52.5)
HDLC SERPL-MCNC: 40 MG/DL (ref 40–60)
HEMOGLOBIN: 14.6 G/DL (ref 13.5–17.5)
LDL CHOLESTEROL CALCULATED: 90 MG/DL
LV EF: 70 %
LVEF MODALITY: NORMAL
MCH RBC QN AUTO: 29.9 PG (ref 26–34)
MCHC RBC AUTO-ENTMCNC: 34.1 G/DL (ref 31–36)
MCV RBC AUTO: 87.8 FL (ref 80–100)
PDW BLD-RTO: 13.5 % (ref 12.4–15.4)
PLATELET # BLD: 204 K/UL (ref 135–450)
PMV BLD AUTO: 9.2 FL (ref 5–10.5)
POTASSIUM REFLEX MAGNESIUM: 3.9 MMOL/L (ref 3.5–5.1)
RBC # BLD: 4.89 M/UL (ref 4.2–5.9)
SODIUM BLD-SCNC: 141 MMOL/L (ref 136–145)
TRIGL SERPL-MCNC: 105 MG/DL (ref 0–150)
TSH SERPL DL<=0.05 MIU/L-ACNC: 2.26 UIU/ML (ref 0.27–4.2)
VLDLC SERPL CALC-MCNC: 21 MG/DL
WBC # BLD: 9.3 K/UL (ref 4–11)

## 2020-09-14 PROCEDURE — 93017 CV STRESS TEST TRACING ONLY: CPT

## 2020-09-14 PROCEDURE — 6370000000 HC RX 637 (ALT 250 FOR IP): Performed by: INTERNAL MEDICINE

## 2020-09-14 PROCEDURE — 83036 HEMOGLOBIN GLYCOSYLATED A1C: CPT

## 2020-09-14 PROCEDURE — 80048 BASIC METABOLIC PNL TOTAL CA: CPT

## 2020-09-14 PROCEDURE — 2580000003 HC RX 258: Performed by: INTERNAL MEDICINE

## 2020-09-14 PROCEDURE — 93010 ELECTROCARDIOGRAM REPORT: CPT | Performed by: INTERNAL MEDICINE

## 2020-09-14 PROCEDURE — 3430000000 HC RX DIAGNOSTIC RADIOPHARMACEUTICAL: Performed by: NURSE PRACTITIONER

## 2020-09-14 PROCEDURE — 85027 COMPLETE CBC AUTOMATED: CPT

## 2020-09-14 PROCEDURE — 99233 SBSQ HOSP IP/OBS HIGH 50: CPT | Performed by: NURSE PRACTITIONER

## 2020-09-14 PROCEDURE — 93005 ELECTROCARDIOGRAM TRACING: CPT | Performed by: INTERNAL MEDICINE

## 2020-09-14 PROCEDURE — A9502 TC99M TETROFOSMIN: HCPCS | Performed by: INTERNAL MEDICINE

## 2020-09-14 PROCEDURE — A9502 TC99M TETROFOSMIN: HCPCS | Performed by: NURSE PRACTITIONER

## 2020-09-14 PROCEDURE — 2500000003 HC RX 250 WO HCPCS: Performed by: INTERNAL MEDICINE

## 2020-09-14 PROCEDURE — G0378 HOSPITAL OBSERVATION PER HR: HCPCS

## 2020-09-14 PROCEDURE — 3430000000 HC RX DIAGNOSTIC RADIOPHARMACEUTICAL: Performed by: INTERNAL MEDICINE

## 2020-09-14 PROCEDURE — 85730 THROMBOPLASTIN TIME PARTIAL: CPT

## 2020-09-14 PROCEDURE — 78452 HT MUSCLE IMAGE SPECT MULT: CPT

## 2020-09-14 PROCEDURE — 36415 COLL VENOUS BLD VENIPUNCTURE: CPT

## 2020-09-14 PROCEDURE — 80061 LIPID PANEL: CPT

## 2020-09-14 RX ADMIN — PANTOPRAZOLE SODIUM 40 MG: 40 TABLET, DELAYED RELEASE ORAL at 08:02

## 2020-09-14 RX ADMIN — TETROFOSMIN 11.2 MILLICURIE: 1.38 INJECTION, POWDER, LYOPHILIZED, FOR SOLUTION INTRAVENOUS at 09:14

## 2020-09-14 RX ADMIN — TETROFOSMIN 30.9 MILLICURIE: 1.38 INJECTION, POWDER, LYOPHILIZED, FOR SOLUTION INTRAVENOUS at 10:50

## 2020-09-14 RX ADMIN — SUCRALFATE 1 G: 1 TABLET ORAL at 11:57

## 2020-09-14 RX ADMIN — ASPIRIN 81 MG: 81 TABLET, CHEWABLE ORAL at 07:55

## 2020-09-14 RX ADMIN — HEPARIN SODIUM 11.3 ML/HR: 10000 INJECTION, SOLUTION INTRAVENOUS at 05:34

## 2020-09-14 RX ADMIN — PANTOPRAZOLE SODIUM 40 MG: 40 TABLET, DELAYED RELEASE ORAL at 16:04

## 2020-09-14 RX ADMIN — SODIUM CHLORIDE, PRESERVATIVE FREE 10 ML: 5 INJECTION INTRAVENOUS at 08:03

## 2020-09-14 NOTE — CARE COORDINATION
Chart review completed. Patient a 54year old male, admitted for unstable angina. Hospital day 1, currently on C4. Pt being evaluated by cardiology. Pt appears independent prior to admission. Pt stated pain had been \"off and on\" for about 1 week. Salina Cuenca MD is listed as primary care provider and Veronica is payor. No new needs noted. Please advise should any needs arise.  Enoc Khan RN

## 2020-09-14 NOTE — DISCHARGE INSTR - ACTIVITY
Activity as tolerated, when you start to feel tingling sensations prior to near syncopal spells to sit/ lie down and drink more water.

## 2020-09-14 NOTE — FLOWSHEET NOTE
09/13/20 2042   Assessment   Charting Type Shift assessment   Neurological   Neuro (WDL) WDL   Level of Consciousness 0   Orientation Level Oriented X4   Cognition Follows commands   Language Clear   [place] Coma Scale   Eye Opening 4   Best Verbal Response 5   Best Motor Response 6   [place] Coma Scale Score 15   HEENT   HEENT (WDL) WDL   Respiratory   Respiratory (WDL) WDL   Respiratory Pattern Regular   Respiratory Depth Normal   Respiratory Quality/Effort Unlabored   Chest Assessment Chest expansion symmetrical   L Breath Sounds Clear   R Breath Sounds Clear   Breath Sounds   Right Upper Lobe Clear   Right Middle Lobe Clear   Right Lower Lobe Clear   Left Upper Lobe Clear   Left Lower Lobe Clear   Cardiac   Cardiac (WDL) WDL   Cardiac Regularity Regular   Heart Sounds S1, S2   Cardiac Rhythm NSR   Cardiac Monitor   Telemetry Monitor On Yes   Telemetry Audible Yes   Telemetry Alarms Set Yes   Gastrointestinal   Abdominal (WDL) WDL   Abdomen Inspection Soft   Tenderness Nontender; Soft   RUQ Bowel Sounds Active   LUQ Bowel Sounds Active   RLQ Bowel Sounds Active   LLQ Bowel Sounds Active   Peripheral Vascular   Peripheral Vascular (WDL) WDL   Skin Color/Condition   Skin Color/Condition (WDL) WDL   Skin Integrity   Skin Integrity (WDL) WDL   Musculoskeletal   Musculoskeletal (WDL) WDL   Genitourinary   Genitourinary (WDL) WDL   Anus/Rectum   Anus/Rectum (WDL) WDL   Psychosocial   Psychosocial (WDL) X   Patient Behaviors Anxious

## 2020-09-14 NOTE — PROGRESS NOTES
Angela 80   Daily Progress Note    Admit Date:  9/13/2020  HPI:  Presented to [facility] ED for chest pain that has been present over past several months but worsened recently. Non-exertional, no radiation. Hx of HTN and GERD. While in the ED he had syncopal spell associated with 5.2 second pause. Transferred to [facility], started on ASA, statin, IV heparin. Subjective:  Mr. Ruven Peabody describes the chest pain as a sharp pain, non-radiating. He describes the syncopal spell as occurring after IV flushed which has occurred in past as well. He admits to anxiety/ worrying easily and notes a tingling sensation with this. Objective:   /86   Pulse 91   Temp 98.5 °F (36.9 °C) (Oral)   Resp 16   Ht 5' 10\" (1.778 m)   Wt 211 lb 4.8 oz (95.8 kg)   SpO2 98%   BMI 30.32 kg/m²       Intake/Output Summary (Last 24 hours) at 9/14/2020 0837  Last data filed at 9/14/2020 0750  Gross per 24 hour   Intake 244 ml   Output 2200 ml   Net -1956 ml     Wt Readings from Last 3 Encounters:   09/14/20 211 lb 4.8 oz (95.8 kg)   09/13/20 205 lb (93 kg)   04/17/18 205 lb (93 kg)         ASSESSMENT:   1. Chest pain - stress test negative for ishcemia, troponin and ECG without signs of ischemia  2. Syncope with 5.2 sec pause - sounds vasovagal, recurrent  3. HTN - stable, does not require treatment though had hypertensive response during stress test  4. GERD - admits diet has been poor lately with eating and drinking higher acidic drinks/ foods. PLAN:  1. Stop IV Heparin  2. Stop aspirin and statin   3. 2 week event monitor  4. Follow up with me in 1 month   5. Okay for discharge from cardiac perspective. Will review cardiac medications on discharge medication reconciliation form. 6. Encouraged follow up with PCP regarding anxiety. Counseled to stay well hydrated. When he starts to feel tingling sensations prior to near syncopal spells to sit/ lie down and drink more water.       Honey Marvin, APRN - CNP,

## 2020-09-14 NOTE — PROGRESS NOTES
A Viviane stress test was completed on this patient as ordered. The patient tolerated the procedure well. Awaiting stress imaging at this time.

## 2020-09-14 NOTE — PROGRESS NOTES
Patient discharged home with event monitor. Picked up by family. Patient verbalized understanding of dc and follow up instructions. Tele box returned, cmu aware of discharge. Escorted to car by staff.

## 2020-09-14 NOTE — PROGRESS NOTES
Hospitalist Progress Note      PCP: Juanjo Ruiz MD    Date of Admission: 9/13/2020    Chief Complaint: Chest Pain    Subjective: no new c/o. Medications:  Reviewed    Infusion Medications    heparin (Porcine) 11.3 mL/hr (09/14/20 0534)     Scheduled Medications    sodium chloride flush  10 mL Intravenous 2 times per day    atorvastatin  40 mg Oral Nightly    aspirin  81 mg Oral Daily    sucralfate  1 g Oral 4 times per day    pantoprazole  40 mg Oral BID AC     PRN Meds: sodium chloride flush, acetaminophen **OR** acetaminophen, polyethylene glycol, promethazine **OR** ondansetron, heparin (porcine), heparin (porcine), nitroGLYCERIN      Intake/Output Summary (Last 24 hours) at 9/14/2020 0936  Last data filed at 9/14/2020 0750  Gross per 24 hour   Intake 244 ml   Output 2200 ml   Net -1956 ml       Physical Exam Performed:    /86   Pulse 91   Temp 98.5 °F (36.9 °C) (Oral)   Resp 16   Ht 5' 10\" (1.778 m)   Wt 211 lb 4.8 oz (95.8 kg)   SpO2 98%   BMI 30.32 kg/m²     General appearance: No apparent distress, appears stated age and cooperative. HEENT: Pupils equal, round, and reactive to light. Conjunctivae/corneas clear. Neck: Supple, with full range of motion. No jugular venous distention. Trachea midline. Respiratory:  Normal respiratory effort. Clear to auscultation, bilaterally without Rales/Wheezes/Rhonchi. Cardiovascular: Regular rate and rhythm with normal S1/S2 without murmurs, rubs or gallops. Abdomen: Soft, non-tender, non-distended with normal bowel sounds. Musculoskeletal: No clubbing, cyanosis or edema bilaterally. Full range of motion without deformity. Skin: Skin color, texture, turgor normal.  No rashes or lesions. Neurologic:  Neurovascularly intact without any focal sensory/motor deficits.  Cranial nerves: II-XII intact, grossly non-focal.  Psychiatric: Alert and oriented, thought content appropriate, normal insight  Capillary Refill: Brisk,< 3 seconds   Peripheral Pulses: +2 palpable, equal bilaterally       Labs:   Recent Labs     09/13/20  0605 09/14/20  0436   WBC 8.8 9.3   HGB 14.9 14.6   HCT 44.5 42.9    204     Recent Labs     09/13/20  0605 09/14/20  0436    141   K 4.2 3.9    107   CO2 27 23   BUN 14 14   CREATININE 0.9 0.7*   CALCIUM 8.9 8.8     Recent Labs     09/13/20  0605   AST 18   ALT 17   BILITOT 0.4   ALKPHOS 77     No results for input(s): INR in the last 72 hours. Recent Labs     09/13/20  0605 09/13/20  1107 09/13/20  1242   TROPONINI <0.01 <0.01 <0.01       Urinalysis:      Lab Results   Component Value Date    NITRU Negative 04/17/2018    WBCUA 0-2 04/17/2018    BACTERIA 1+ 04/17/2018    RBCUA 0-2 04/17/2018    BLOODU TRACE-LYSED 04/17/2018    SPECGRAV >=1.030 04/17/2018    GLUCOSEU Negative 04/17/2018       Consults:    IP CONSULT TO CARDIOLOGY      Assessment/Plan:    Active Hospital Problems    Diagnosis    Obesity [E66.9]    GERD (gastroesophageal reflux disease) [K21.9]    Bradycardia [R00.1]    Syncope [R55]    Unstable angina (HCC) [I20.0]       Chest pain - Concerning to ED for ACS, etiology clinically unable to determine. Initial enzymes/EKG negative. Follow serial enzymes, reviewed and documented as above and monitor on tele, w/out evidence of ischemia/arrythmia. Stress test NOT YET ordered pending Cardiology recs - consulted from [facility] ED and appreciated in advance. GERD - w/out active signs/sxs of dysphagia/odynophagia. No evidence of active PUD or hx of GI bleed. Controlled on home PPI - continue. Obesity -  With Body mass index is 30.32 kg/m². Complicating assessment and treatment. Placing patient at risk for multiple co-morbidities as well as early death and contributing to the patient's presentation. Counseled on weight loss.       DVT Prophylaxis: Heparin gtt  Diet: Diet NPO, After Midnight  Code Status: Full Code      PT/OT Eval Status: not ordered.     Dispo - Likely Tues/Wed 15/16 Sept at the earliest pending clinical course and subspecialty recs.      Filiberto Steel MD

## 2020-09-15 NOTE — DISCHARGE SUMMARY
Hospital Medicine Discharge Summary    Patient ID: Td Cowart      Patient's PCP: Scotty Fair MD    Admit Date: 9/13/2020     Discharge Date: 9/14/2020      Admitting Physician: Tommy Rivero MD     Discharge Physician: Silvia Sampson MD     Discharge Diagnoses: Active Hospital Problems    Diagnosis    Obesity [E66.9]    GERD (gastroesophageal reflux disease) [K21.9]    Bradycardia [R00.1]    Syncope [R55]    Precordial pain [R07.2]    Essential hypertension [I10]    Unstable angina (HCC) [I20.0]       The patient was seen and examined on day of discharge and this discharge summary is in conjunction with any daily progress note from day of discharge. Hospital Course:       Chest pain - Concerning to ED for ACS, etiology clinically unable to determine. Initial enzymes/EKG negative. Follow serial enzymes, reviewed and documented as above and monitor on tele, w/out evidence of ischemia/arrythmia. Stress test NOT YET ordered pending Cardiology recs - consulted from [facility] ED and appreciated - event monitor placed.      GERD - w/out active signs/sxs of dysphagia/odynophagia. No evidence of active PUD or hx of GI bleed. Controlled on home PPI - continue.     Obesity -  With Body mass index is 30.32 kg/m². Complicating assessment and treatment. Placing patient at risk for multiple co-morbidities as well as early death and contributing to the patient's presentation. Counseled on weight loss.         Labs:  For convenience and continuity at follow-up the following most recent labs are provided:      CBC:    Lab Results   Component Value Date    WBC 9.3 09/14/2020    HGB 14.6 09/14/2020    HCT 42.9 09/14/2020     09/14/2020       Renal:    Lab Results   Component Value Date     09/14/2020    K 3.9 09/14/2020     09/14/2020    CO2 23 09/14/2020    BUN 14 09/14/2020    CREATININE 0.7 09/14/2020    CALCIUM 8.8 09/14/2020         Significant Diagnostic Studies    Radiology:   NM Cardiac Stress Test Nuclear Imaging   Final Result             Consults:     IP CONSULT TO CARDIOLOGY    Disposition: Home     Condition at Discharge: Stable    Discharge Instructions/Follow-up:  w/ PCP 1-2 weeks and subspecialists as arranged. Code Status:  Full Code    Activity: activity as tolerated    Diet: regular diet      Discharge Medications:     Discharge Medication List as of 9/14/2020  4:28 PM           Details   naproxen (NAPROSYN) 500 MG tablet Take 1 tablet by mouth 2 times daily for 10 days, Disp-20 tablet, R-0Print      omeprazole (PRILOSEC) 20 MG capsule Take 20 mg by mouth daily             Time Spent on discharge is more than 30 minutes in the examination, evaluation, counseling and review of medications and discharge plan. Signed:    Kavitha Patel MD   9/15/2020      Thank you Leandro Doe MD for the opportunity to be involved in this patient's care. If you have any questions or concerns please feel free to contact me at [phone].

## 2020-10-06 ENCOUNTER — HOSPITAL ENCOUNTER (EMERGENCY)
Age: 45
Discharge: HOME | End: 2020-10-07
Attending: EMERGENCY MEDICINE
Payer: COMMERCIAL

## 2020-10-06 ENCOUNTER — APPOINTMENT (OUTPATIENT)
Dept: GENERAL RADIOLOGY | Age: 45
End: 2020-10-06
Payer: COMMERCIAL

## 2020-10-06 LAB
A/G RATIO: 1.9 (ref 1.1–2.2)
ALBUMIN SERPL-MCNC: 4.2 G/DL (ref 3.4–5)
ALP BLD-CCNC: 86 U/L (ref 40–129)
ALT SERPL-CCNC: 20 U/L (ref 10–40)
ANION GAP SERPL CALCULATED.3IONS-SCNC: 11 MMOL/L (ref 3–16)
AST SERPL-CCNC: 17 U/L (ref 15–37)
BASOPHILS ABSOLUTE: 0 K/UL (ref 0–0.2)
BASOPHILS RELATIVE PERCENT: 0.4 %
BILIRUB SERPL-MCNC: <0.2 MG/DL (ref 0–1)
BUN BLDV-MCNC: 14 MG/DL (ref 7–20)
CALCIUM SERPL-MCNC: 9.1 MG/DL (ref 8.3–10.6)
CHLORIDE BLD-SCNC: 108 MMOL/L (ref 99–110)
CO2: 23 MMOL/L (ref 21–32)
CREAT SERPL-MCNC: 0.9 MG/DL (ref 0.9–1.3)
EOSINOPHILS ABSOLUTE: 0.2 K/UL (ref 0–0.6)
EOSINOPHILS RELATIVE PERCENT: 1.7 %
GFR AFRICAN AMERICAN: >60
GFR NON-AFRICAN AMERICAN: >60
GLOBULIN: 2.2 G/DL
GLUCOSE BLD-MCNC: 192 MG/DL (ref 70–99)
HCT VFR BLD CALC: 41.7 % (ref 40.5–52.5)
HEMOGLOBIN: 14.3 G/DL (ref 13.5–17.5)
LYMPHOCYTES ABSOLUTE: 1.7 K/UL (ref 1–5.1)
LYMPHOCYTES RELATIVE PERCENT: 17.8 %
MCH RBC QN AUTO: 29.7 PG (ref 26–34)
MCHC RBC AUTO-ENTMCNC: 34.2 G/DL (ref 31–36)
MCV RBC AUTO: 87 FL (ref 80–100)
MONOCYTES ABSOLUTE: 0.7 K/UL (ref 0–1.3)
MONOCYTES RELATIVE PERCENT: 7.6 %
NEUTROPHILS ABSOLUTE: 7.1 K/UL (ref 1.7–7.7)
NEUTROPHILS RELATIVE PERCENT: 72.5 %
PDW BLD-RTO: 13.4 % (ref 12.4–15.4)
PLATELET # BLD: 244 K/UL (ref 135–450)
PMV BLD AUTO: 9.3 FL (ref 5–10.5)
POTASSIUM REFLEX MAGNESIUM: 3.6 MMOL/L (ref 3.5–5.1)
RBC # BLD: 4.79 M/UL (ref 4.2–5.9)
SODIUM BLD-SCNC: 142 MMOL/L (ref 136–145)
TOTAL PROTEIN: 6.4 G/DL (ref 6.4–8.2)
TROPONIN: <0.01 NG/ML
WBC # BLD: 9.8 K/UL (ref 4–11)

## 2020-10-06 PROCEDURE — 36415 COLL VENOUS BLD VENIPUNCTURE: CPT

## 2020-10-06 PROCEDURE — 85025 COMPLETE CBC W/AUTO DIFF WBC: CPT

## 2020-10-06 PROCEDURE — 71045 X-RAY EXAM CHEST 1 VIEW: CPT

## 2020-10-06 PROCEDURE — 93005 ELECTROCARDIOGRAM TRACING: CPT | Performed by: EMERGENCY MEDICINE

## 2020-10-06 PROCEDURE — 80053 COMPREHEN METABOLIC PANEL: CPT

## 2020-10-06 PROCEDURE — 84484 ASSAY OF TROPONIN QUANT: CPT

## 2020-10-06 PROCEDURE — 93272 ECG/REVIEW INTERPRET ONLY: CPT | Performed by: INTERNAL MEDICINE

## 2020-10-06 PROCEDURE — 99285 EMERGENCY DEPT VISIT HI MDM: CPT

## 2020-10-06 ASSESSMENT — PAIN SCALES - GENERAL: PAINLEVEL_OUTOF10: 4

## 2020-10-06 ASSESSMENT — PAIN DESCRIPTION - PROGRESSION: CLINICAL_PROGRESSION: GRADUALLY IMPROVING

## 2020-10-06 ASSESSMENT — PAIN DESCRIPTION - DESCRIPTORS: DESCRIPTORS: ACHING

## 2020-10-06 ASSESSMENT — PAIN DESCRIPTION - PAIN TYPE: TYPE: ACUTE PAIN

## 2020-10-06 ASSESSMENT — PAIN DESCRIPTION - LOCATION: LOCATION: CHEST

## 2020-10-07 VITALS
TEMPERATURE: 97.6 F | HEART RATE: 96 BPM | BODY MASS INDEX: 30.21 KG/M2 | DIASTOLIC BLOOD PRESSURE: 84 MMHG | RESPIRATION RATE: 16 BRPM | OXYGEN SATURATION: 98 % | SYSTOLIC BLOOD PRESSURE: 131 MMHG | HEIGHT: 70 IN | WEIGHT: 211 LBS

## 2020-10-07 LAB
EKG ATRIAL RATE: 113 BPM
EKG ATRIAL RATE: 99 BPM
EKG DIAGNOSIS: NORMAL
EKG DIAGNOSIS: NORMAL
EKG P AXIS: 28 DEGREES
EKG P AXIS: 33 DEGREES
EKG P-R INTERVAL: 152 MS
EKG P-R INTERVAL: 158 MS
EKG Q-T INTERVAL: 336 MS
EKG Q-T INTERVAL: 350 MS
EKG QRS DURATION: 96 MS
EKG QRS DURATION: 98 MS
EKG QTC CALCULATION (BAZETT): 449 MS
EKG QTC CALCULATION (BAZETT): 460 MS
EKG R AXIS: 23 DEGREES
EKG R AXIS: 32 DEGREES
EKG T AXIS: 74 DEGREES
EKG T AXIS: 74 DEGREES
EKG VENTRICULAR RATE: 113 BPM
EKG VENTRICULAR RATE: 99 BPM

## 2020-10-07 PROCEDURE — 93010 ELECTROCARDIOGRAM REPORT: CPT | Performed by: INTERNAL MEDICINE

## 2020-10-07 NOTE — ED PROVIDER NOTES
Emergency Department Attending Note    Marli Cifuentes MD    Date of ED VIsit: 10/6/2020    CHIEF COMPLAINT  Chest Pain (C/O mid-sternal chest pain x 1 hour. Negative stress test 2 weeks ago. Pt appears anxious)      HISTORY OF PRESENT ILLNESS  Ave Larsen is a 54 y.o. male  With Vital signs of BP (!) 145/97   Pulse 119   Temp 97.6 °F (36.4 °C) (Oral)   Resp 16   Ht 5' 10\" (1.778 m)   Wt 211 lb (95.7 kg)   SpO2 100%   BMI 30.28 kg/m²  who presents to the ED with a complaint of chest pain. Patient seen and evaluated in room 6. Patient is an anxious individual who comes in complaining of substernal chest pain that he is not able to qualify. He has had this pain earlier in September had a stress test on the 13th which was negative. He also wore a Holter monitor which was negative as well. He now comes in with the same pain. Pain comes and goes sometimes and only lasts for a few seconds no shortness of breath no fevers no chills no nausea no vomiting no sweating. Is got no abdominal discomfort is no  discomforts no neurologic symptoms. .  No other complaints, modifying factors or associated symptoms. HEART SCORE:    History: +0 for low suspicion  EKG: +1 for nonspecific changes   Age: +1 for age 44-72 years  Risk factors (includes HLD, HTN, DM, tobacco use, obesity, and +FHx): +0 for no risk factors  Initial troponin: +0 for negative troponin    Heart score: 2. This falls under the following category: Score of 0-3, which indicates a very low risk for major adverse cardiac event and supports early discharge          Patients Past medical history reviewed and listed below  Past Medical History:   Diagnosis Date    GERD (gastroesophageal reflux disease)     Perforated appendicitis      Past Surgical History:   Procedure Laterality Date    APPENDECTOMY  3/2/2016    Laparoscopic       I have reviewed the following from the nursing documentation.     Family History   Problem Relation Age of Onset  Cancer Mother     Heart Disease Mother      Social History     Socioeconomic History    Marital status:      Spouse name: Not on file    Number of children: Not on file    Years of education: Not on file    Highest education level: Not on file   Occupational History    Not on file   Social Needs    Financial resource strain: Not on file    Food insecurity     Worry: Not on file     Inability: Not on file    Transportation needs     Medical: Not on file     Non-medical: Not on file   Tobacco Use    Smoking status: Never Smoker    Smokeless tobacco: Never Used   Substance and Sexual Activity    Alcohol use: No    Drug use: No    Sexual activity: Yes     Partners: Female   Lifestyle    Physical activity     Days per week: Not on file     Minutes per session: Not on file    Stress: Not on file   Relationships    Social connections     Talks on phone: Not on file     Gets together: Not on file     Attends Buddhist service: Not on file     Active member of club or organization: Not on file     Attends meetings of clubs or organizations: Not on file     Relationship status: Not on file    Intimate partner violence     Fear of current or ex partner: Not on file     Emotionally abused: Not on file     Physically abused: Not on file     Forced sexual activity: Not on file   Other Topics Concern    Not on file   Social History Narrative    Not on file     No current facility-administered medications for this encounter.       Current Outpatient Medications   Medication Sig Dispense Refill    omeprazole (PRILOSEC) 20 MG capsule Take 20 mg by mouth daily      naproxen (NAPROSYN) 500 MG tablet Take 1 tablet by mouth 2 times daily for 10 days 20 tablet 0     Allergies   Allergen Reactions    Pcn [Penicillins] Hives       REVIEW OF SYSTEMS  10 systems reviewed, pertinent positives per HPI otherwise noted to be negative     PHYSICAL EXAM  BP (!) 145/97   Pulse 119   Temp 97.6 °F (36.4 °C) (Oral) Resp 16   Ht 5' 10\" (1.778 m)   Wt 211 lb (95.7 kg)   SpO2 100%   BMI 30.28 kg/m²   GENERAL APPEARANCE: Awake and alert. Cooperative. In no obvious distress. But appears quite anxious  HEAD: Normocephalic. Atraumatic. EYES: PERRL. EOM's grossly intact. ENT: Mucous membranes are pink and moist.   NECK: Supple. HEART: RRR. No murmurs. LUNGS: Respirations unlabored. CTAB. Good air exchange. ABDOMEN: Soft. Non-distended. Non-tender. No masses. No organomegaly. No guarding or rebound. EXTREMITIES: No peripheral edema. Moves all extremities equally. All extremities neurovascularly intact. SKIN: Warm and dry. No acute rashes. NEUROLOGICAL: Alert and oriented. Strength 5/5, sensation intact. Gait normal.   PSYCHIATRIC: Normal mood and affect. No HI or SI expressed to me. RADIOLOGY    If acquired see below     EKG:     If acquired see below       ED COURSE/MDM    All the studies were negative for this patient. His EKG was unchanged from September as well he had a negative nuclear stress test at that time as well so I not think that this is cardiac in nature at all. There seems to be an anxiety overlay test to it as well. Despite that a minute tell him to follow-up with his primary care doctor for further testing if needed. ED Course as of Oct 07 0043   Tue Oct 06, 2020   9718 EKG: #1  Indication: Chest pain, it shows a rhythm of sinus tachycardia at a rate of 113, with no acute ST-Twave changes to indicate ischemia but some nonspecific T wave inversions in V2 V3 V1 prompting a posterior EKG. Intervals are normal and the axis is normal. This  interpreted by me without a cardiologist. Comparison EKG dated 9/14/2020 revealed no significant change. [DL]   0673 EKG: #2 Posterior  Indication: Chest pain, it shows a rhythm of normal sinus rhythm at a rate of 99, with no acute ST-Twave changes to indicate ischemia and changes as seen on the anterior.   Intervals are normal and the axis is normal. This  interpreted by me without a cardiologist.       [DL]   [id number] [id number] CBC was normal with a white count of 9.8 H&H of 1441 and a platelet count of 970   CBC Auto Differential:    WBC 9.8   RBC 4.79   Hemoglobin Quant 14.3   Hematocrit 41.7   MCV 87.0   MCH 29.7   MCHC 34.2   RDW 13.4   Platelet Count 768   MPV 9.3   Neutrophils % 72.5   Lymphocyte % 17.8   Monocytes % 7.6   Eosinophils % 1.7   Basophils % 0.4   Neutrophils Absolute 7.1   Lymphocytes Absolute 1.7   Monocytes Absolute 0.7   Eosinophils Absolute 0.2   Basophils Absolute 0.0 [DL]   2313 Comprehensive metabolic panel was normal except for glucose of 192   Comprehensive Metabolic Panel w/ Reflex to MG(!):    Sodium 142   Potassium 3.6   Chloride 108   CO2 23   Anion Gap 11   Glucose 192(!)   BUN 14   Creatinine 0.9   GFR Non- >60   GFR African American >60   Calcium 9.1   Total Protein 6.4   Albumin 4.2   Albumin/Globulin Ratio 1.9   Bilirubin <0.2   Alk Phos 86   ALT 20   AST 17   Globulin 2.2 [DL]   2314 IMPRESSION:  No acute cardiopulmonary findings.            XR CHEST PORTABLE [DL]      ED Course User Index  [DL] Cari Florse MD       The ED course and plan were reviewed and results discussed with the patient    The patient understood and agreed with the Discharge/transfer planning.     CLINICAL IMPRESSION and [address] was stable and diagnosed with chest pain       Cari Flores MD  10/07/20 1701

## 2020-10-09 ENCOUNTER — TELEPHONE (OUTPATIENT)
Dept: CARDIOLOGY CLINIC | Age: 45
End: 2020-10-09

## 2020-10-09 NOTE — TELEPHONE ENCOUNTER
Called patient. No answer. LM to return call concerning his monitor results. Results per AGK: no significant arrhythmias.

## 2020-10-21 NOTE — PROGRESS NOTES
[address]   Cardiac Evaluation    Primary Care Doctor:  Cuco Lewis MD    Chief Complaint   Patient presents with    Follow-Up from Hospital        History of Present Illness:   I had the pleasure of seeing Lisa Menchaca in follow up for recent hospitalization. Lisa Menchaca presented to [facility] ED with chest pain that has been present over past several months but worsened recently. Non-exertional, no radiation. Hx of HTN and GERD. While in the ED he had syncopal spell associated with 5.2 second pause. Transferred to [facility], started on ASA, statin, IV heparin. Troponin's negative. Stress test negative for ischemia though noted hypertensive response. A 2 week event monitor showed NSR with few episodes of SVT that were brief. When symptoms of chest pain were reported, there were no arrhythmia's. He was seen in the ED last week with recurrent chest pain with negative ECG and troponin's, felt to not be cardiac. He has had only mild chest pain on 1 occurrence since then. He felt related to diet (ate spicy foods). He has seen PCP who plans on further GI testing but waiting on monitor results. Lisa Menchaca describes symptoms including chest pain, exertional chest pressure/discomfort but denies dyspnea, palpitations, orthopnea, PND, early saiety, edema, syncope. Past Medical History:   has a past medical history of GERD (gastroesophageal reflux disease) and Perforated appendicitis. Surgical History:   has a past surgical history that includes Appendectomy (3/2/2016). Social History:   reports that he has never smoked. He has never used smokeless tobacco. He reports that he does not drink alcohol or use drugs. Family History:   Family History   Problem Relation Age of Onset    Cancer Mother     Heart Disease Mother        Home Medications:  Prior to Admission medications    Medication Sig Start Date End Date Taking?  Authorizing Provider   omeprazole (PRILOSEC) 20 MG capsule Take 20 mg by mouth daily   Yes Historical Provider, MD        Allergies:  Pcn [penicillins]     Review of Systems:   · Constitutional: there has been no unanticipated weight loss. · Eyes: No vision changes  · ENT: No Headaches, no nasal congestion. No mouth sores or sore throat. · Cardiovascular: Reviewed in HPI  · Respiratory: No cough or wheezing, no sputum production. · Gastrointestinal: No abdominal pain, no constipation or diarrhea  · Genitourinary: No dysuria, trouble voiding, or hematuria. · Musculoskeletal:  No weakness or joint complaints. · Integumentary: No rash or pruritis. · Neurological: No numbness or tingling. No weakness. No tremor. · Psychiatric: No anxiety, no depression. · Endocrine:  No excessive thirst or urination. · Hematologic/Lymphatic: No abnormal bruising or bleeding, blood clots or swollen lymph nodes. Physical Examination:    Vitals:    10/22/20 0855   BP: 138/82   Pulse: 94   SpO2: 98%   Weight: 215 lb (97.5 kg)   Height: 5' 10\" (1.778 m)        Constitutional and General Appearance: Warm and dry, no apparent distress, normal coloration  HEENT:  Normocephalic, atraumatic  Respiratory:  · Normal excursion and expansion without use of accessory muscles  · Resp Auscultation: Normal breath sounds without dullness  Cardiovascular:  · The apical impulses not displaced  · Heart tones are crisp and normal  · JVP 8 cm H2O  · Regular rate and rhythm, normal S1S2, no m/g/r  · Peripheral pulses are symmetrical and full  · There is no clubbing, cyanosis of the extremities.   · No BLE edema  · Pedal Pulses: 2+ and equal     Abdomen:  · No masses or tenderness  · Liver/Spleen: No Abnormalities Noted  Neurological/Psychiatric:  · Alert and oriented in all spheres  · Moves all extremities well  · Exhibits normal gait balance and coordination  · No abnormalities of mood, affect, memory, mentation, or behavior are noted    Lab Data:  CBC:   Lab Results   Component Value Date WBC 9.8 10/06/2020    WBC 9.3 2020    WBC 8.8 2020    RBC 4.79 10/06/2020    RBC 4.89 2020    RBC 5.10 2020    HGB 14.3 10/06/2020    HGB 14.6 2020    HGB 14.9 2020    HCT 41.7 10/06/2020    HCT 42.9 2020    HCT 44.5 2020    MCV 87.0 10/06/2020    MCV 87.8 2020    MCV 87.3 2020    RDW 13.4 10/06/2020    RDW 13.5 2020    RDW 13.6 2020     10/06/2020     2020     2020     BMP:  Lab Results   Component Value Date     10/06/2020     2020     2020    K 3.6 10/06/2020    K 3.9 2020    K 4.2 2020     10/06/2020     2020     2020    CO2 23 10/06/2020    CO2 23 2020    CO2 27 2020    BUN 14 10/06/2020    BUN 14 2020    BUN 14 2020    CREATININE 0.9 10/06/2020    CREATININE 0.7 2020    CREATININE 0.9 2020     BNP: No results found for: PROBNP  Troponin: No components found for: TROPONIN  Lipids:   Lab Results   Component Value Date    TRIG 105 2020    HDL 40 2020    LDLCALC 90 2020     Cardiac Imagin WEEK EVENT MONITOR:               STRESS MPI 20   Summary     Normal myocardial perfusion. Normal LV size and systolic function. Normal wall motion. Left ventricular ejection fraction is 70 %. ECG Findings     There is <1 mm ST segment depression in the anterior leads     during exercise that does not meet criteria for     stress-induced ischemia. Assessment:    1. Chest pain, unspecified type    2. Bradycardia    3. Syncope, unspecified syncope type    4. Essential hypertension          Plan:   1. No further heart testing  2. If you start to feel tingling, like you are going to pass out, lie down and hydrate  3. Follow up here only as needed  4.  Follow up with Dr. Lena Wells for further testing/ managment      I appreciate the opportunity of cooperating in the care of this individual.    Worth Leyden, CNP, 10/22/2020, 9:00 AM    QUALITY MEASURES  1. Tobacco Cessation Counseling: NA  2. Retake of BP if >140/90:   NA  3. Documentation to PCP/referring for new patient:  Sent to PCP at close of office visit  4. CAD patient on anti-platelet: NA  5. CAD patient on STATIN therapy:  NA  6.  Patient with CHF and aFib on anticoagulation:  NA

## 2020-10-22 ENCOUNTER — OFFICE VISIT (OUTPATIENT)
Dept: CARDIOLOGY CLINIC | Age: 45
End: 2020-10-22
Payer: COMMERCIAL

## 2020-10-22 VITALS
SYSTOLIC BLOOD PRESSURE: 138 MMHG | WEIGHT: 215 LBS | HEIGHT: 70 IN | DIASTOLIC BLOOD PRESSURE: 82 MMHG | HEART RATE: 94 BPM | OXYGEN SATURATION: 98 % | BODY MASS INDEX: 30.78 KG/M2

## 2020-10-22 PROCEDURE — 99213 OFFICE O/P EST LOW 20 MIN: CPT | Performed by: NURSE PRACTITIONER

## 2020-10-22 NOTE — PATIENT INSTRUCTIONS
1. No further heart testing  2. If you start to feel tingling, like you are going to pass out, lie down and hydrate  3. Follow up here only as needed  4.  Follow up with Dr. Ibanez Alert for further testing/ managment

## 2021-10-19 ENCOUNTER — HOSPITAL ENCOUNTER (OUTPATIENT)
Dept: VASCULAR LAB | Age: 46
Discharge: HOME | End: 2021-10-19
Payer: COMMERCIAL

## 2021-10-19 DIAGNOSIS — R42 DIZZINESS: ICD-10-CM

## 2021-10-19 PROCEDURE — 93880 EXTRACRANIAL BILAT STUDY: CPT

## 2021-12-11 ENCOUNTER — HOSPITAL ENCOUNTER (EMERGENCY)
Age: 46
Discharge: HOME | End: 2021-12-11
Attending: EMERGENCY MEDICINE
Payer: COMMERCIAL

## 2021-12-11 ENCOUNTER — APPOINTMENT (OUTPATIENT)
Dept: GENERAL RADIOLOGY | Age: 46
End: 2021-12-11
Payer: COMMERCIAL

## 2021-12-11 VITALS
HEART RATE: 94 BPM | BODY MASS INDEX: 30.06 KG/M2 | DIASTOLIC BLOOD PRESSURE: 74 MMHG | HEIGHT: 70 IN | RESPIRATION RATE: 18 BRPM | OXYGEN SATURATION: 99 % | WEIGHT: 210 LBS | SYSTOLIC BLOOD PRESSURE: 130 MMHG | TEMPERATURE: 98.3 F

## 2021-12-11 DIAGNOSIS — R79.89 ELEVATED LFTS: ICD-10-CM

## 2021-12-11 DIAGNOSIS — U07.1 COVID-19: Primary | ICD-10-CM

## 2021-12-11 LAB
A/G RATIO: 1.4 (ref 1.1–2.2)
ALBUMIN SERPL-MCNC: 4.2 G/DL (ref 3.4–5)
ALP BLD-CCNC: 105 U/L (ref 40–129)
ALT SERPL-CCNC: 247 U/L (ref 10–40)
ANION GAP SERPL CALCULATED.3IONS-SCNC: 12 MMOL/L (ref 3–16)
AST SERPL-CCNC: 172 U/L (ref 15–37)
BASOPHILS ABSOLUTE: 0 K/UL (ref 0–0.2)
BASOPHILS RELATIVE PERCENT: 0.3 %
BILIRUB SERPL-MCNC: 0.5 MG/DL (ref 0–1)
BUN BLDV-MCNC: 12 MG/DL (ref 7–20)
CALCIUM SERPL-MCNC: 9.1 MG/DL (ref 8.3–10.6)
CHLORIDE BLD-SCNC: 99 MMOL/L (ref 99–110)
CO2: 26 MMOL/L (ref 21–32)
CREAT SERPL-MCNC: 1 MG/DL (ref 0.9–1.3)
EOSINOPHILS ABSOLUTE: 0 K/UL (ref 0–0.6)
EOSINOPHILS RELATIVE PERCENT: 0.2 %
GFR AFRICAN AMERICAN: >60
GFR NON-AFRICAN AMERICAN: >60
GLUCOSE BLD-MCNC: 111 MG/DL (ref 70–99)
HCT VFR BLD CALC: 46.3 % (ref 40.5–52.5)
HEMOGLOBIN: 16 G/DL (ref 13.5–17.5)
LYMPHOCYTES ABSOLUTE: 1.1 K/UL (ref 1–5.1)
LYMPHOCYTES RELATIVE PERCENT: 22 %
MCH RBC QN AUTO: 29.6 PG (ref 26–34)
MCHC RBC AUTO-ENTMCNC: 34.5 G/DL (ref 31–36)
MCV RBC AUTO: 85.6 FL (ref 80–100)
MONOCYTES ABSOLUTE: 0.4 K/UL (ref 0–1.3)
MONOCYTES RELATIVE PERCENT: 7.2 %
NEUTROPHILS ABSOLUTE: 3.6 K/UL (ref 1.7–7.7)
NEUTROPHILS RELATIVE PERCENT: 70.3 %
PDW BLD-RTO: 13.2 % (ref 12.4–15.4)
PLATELET # BLD: 162 K/UL (ref 135–450)
PMV BLD AUTO: 9.3 FL (ref 5–10.5)
POTASSIUM REFLEX MAGNESIUM: 3.8 MMOL/L (ref 3.5–5.1)
RBC # BLD: 5.41 M/UL (ref 4.2–5.9)
SODIUM BLD-SCNC: 137 MMOL/L (ref 136–145)
TOTAL PROTEIN: 7.2 G/DL (ref 6.4–8.2)
WBC # BLD: 5.1 K/UL (ref 4–11)

## 2021-12-11 PROCEDURE — 2580000003 HC RX 258: Performed by: EMERGENCY MEDICINE

## 2021-12-11 PROCEDURE — 71045 X-RAY EXAM CHEST 1 VIEW: CPT

## 2021-12-11 PROCEDURE — 96374 THER/PROPH/DIAG INJ IV PUSH: CPT

## 2021-12-11 PROCEDURE — 6360000002 HC RX W HCPCS: Performed by: EMERGENCY MEDICINE

## 2021-12-11 PROCEDURE — 36415 COLL VENOUS BLD VENIPUNCTURE: CPT

## 2021-12-11 PROCEDURE — 99285 EMERGENCY DEPT VISIT HI MDM: CPT

## 2021-12-11 PROCEDURE — 80053 COMPREHEN METABOLIC PANEL: CPT

## 2021-12-11 PROCEDURE — 85025 COMPLETE CBC W/AUTO DIFF WBC: CPT

## 2021-12-11 PROCEDURE — 6370000000 HC RX 637 (ALT 250 FOR IP): Performed by: EMERGENCY MEDICINE

## 2021-12-11 RX ORDER — ONDANSETRON 4 MG/1
4 TABLET, ORALLY DISINTEGRATING ORAL EVERY 8 HOURS PRN
Qty: 10 TABLET | Refills: 0 | Status: SHIPPED | OUTPATIENT
Start: 2021-12-11

## 2021-12-11 RX ORDER — 0.9 % SODIUM CHLORIDE 0.9 %
500 INTRAVENOUS SOLUTION INTRAVENOUS ONCE
Status: COMPLETED | OUTPATIENT
Start: 2021-12-11 | End: 2021-12-11

## 2021-12-11 RX ORDER — ONDANSETRON 2 MG/ML
4 INJECTION INTRAMUSCULAR; INTRAVENOUS ONCE
Status: COMPLETED | OUTPATIENT
Start: 2021-12-11 | End: 2021-12-11

## 2021-12-11 RX ORDER — 0.9 % SODIUM CHLORIDE 0.9 %
1000 INTRAVENOUS SOLUTION INTRAVENOUS ONCE
Status: COMPLETED | OUTPATIENT
Start: 2021-12-11 | End: 2021-12-11

## 2021-12-11 RX ORDER — ACETAMINOPHEN 325 MG/1
650 TABLET ORAL ONCE
Status: COMPLETED | OUTPATIENT
Start: 2021-12-11 | End: 2021-12-11

## 2021-12-11 RX ADMIN — SODIUM CHLORIDE 500 ML: 9 INJECTION, SOLUTION INTRAVENOUS at 12:32

## 2021-12-11 RX ADMIN — ACETAMINOPHEN 650 MG: 325 TABLET ORAL at 11:54

## 2021-12-11 RX ADMIN — SODIUM CHLORIDE 1000 ML: 9 INJECTION, SOLUTION INTRAVENOUS at 11:53

## 2021-12-11 RX ADMIN — ONDANSETRON HYDROCHLORIDE 4 MG: 2 INJECTION, SOLUTION INTRAMUSCULAR; INTRAVENOUS at 11:53

## 2021-12-11 ASSESSMENT — ENCOUNTER SYMPTOMS
BLOOD IN STOOL: 0
PHOTOPHOBIA: 0
FACIAL SWELLING: 0
TROUBLE SWALLOWING: 0
WHEEZING: 0
SHORTNESS OF BREATH: 0
COUGH: 1
VOICE CHANGE: 0
COLOR CHANGE: 0
VOMITING: 0
BACK PAIN: 0
STRIDOR: 0
ABDOMINAL PAIN: 0
NAUSEA: 1

## 2021-12-11 ASSESSMENT — PAIN DESCRIPTION - DESCRIPTORS: DESCRIPTORS: CONSTANT;THROBBING

## 2021-12-11 ASSESSMENT — PAIN DESCRIPTION - LOCATION: LOCATION: HEAD

## 2021-12-11 ASSESSMENT — PAIN SCALES - GENERAL: PAINLEVEL_OUTOF10: 2

## 2021-12-11 NOTE — ED PROVIDER NOTES
[facility]  eMERGENCY dEPARTMENT eNCOUnter      Pt Name: Maya Matthews  MRN: [id number]  [place] 1975  Date of evaluation: 12/11/2021  Provider: Melchor Fry MD    CHIEF COMPLAINT       Chief Complaint   Patient presents with    Other     Patient states he is comming in due to covid. Upon furhter questions patient is concerned because he has lost his appetitie, smell and taste. He is further conernced he may be dehydrated. States he is covid positive x 9 days. HISTORY OF PRESENT ILLNESS   (Location/Symptom, Timing/Onset, Context/Setting, Quality, Duration, Modifying Factors, Severity)  Note limiting factors. Maya Matthews is a 51 y.o. male who tested positive for Covid 9 days ago, is on vaccinated, and reports continued loss of taste and smell, dry mouth, diffuse body aches. Patient does report mild nonproductive cough. Denies any shortness of breath. Denies any chest pain, hemoptysis, or leg swelling. Denies any neck stiffness, fevers, or change in mental status. He reports nausea but denies any vomiting. Reports his symptoms are mild to moderate, constant, and worsening. He reports the reason he came to emergency department today is because he felt he needed IV fluids for dehydration. HPI    Nursing Notes were reviewed. REVIEW OFSYSTEMS    (2-9 systems for level 4, 10 or more for level 5)     Review of Systems   Constitutional: Positive for appetite change (decreased) and fatigue. Negative for fever and unexpected weight change. HENT: Negative for facial swelling, trouble swallowing and voice change. Eyes: Negative for photophobia and visual disturbance. Respiratory: Positive for cough. Negative for shortness of breath, wheezing and stridor. Cardiovascular: Negative for chest pain and palpitations. Gastrointestinal: Positive for nausea. Negative for abdominal pain, blood in stool and vomiting.    Genitourinary: Negative for difficulty urinating and dysuria. Musculoskeletal: Positive for arthralgias and myalgias. Negative for back pain, gait problem and neck pain. Skin: Negative for color change and wound. Neurological: Positive for headaches. Negative for seizures, syncope and speech difficulty. Psychiatric/Behavioral: Negative for self-injury and suicidal ideas. Except as noted above the remainder of the review of systems was reviewed and negative. PAST MEDICAL HISTORY     Past Medical History:   Diagnosis Date    GERD (gastroesophageal reflux disease)     Perforated appendicitis          SURGICAL HISTORY       Past Surgical History:   Procedure Laterality Date    APPENDECTOMY  3/2/2016    Laparoscopic         CURRENT MEDICATIONS       Discharge Medication List as of 12/11/2021 12:55 PM      CONTINUE these medications which have NOT CHANGED    Details   omeprazole (PRILOSEC) 20 MG capsule Take 20 mg by mouth daily             ALLERGIES     Pcn [penicillins]    FAMILY HISTORY       Family History   Problem Relation Age of Onset    Cancer Mother     Heart Disease Mother           SOCIAL HISTORY       Social History     Socioeconomic History    Marital status:      Spouse name: None    Number of children: None    Years of education: None    Highest education level: None   Occupational History    None   Tobacco Use    Smoking status: Never Smoker    Smokeless tobacco: Never Used   Substance and Sexual Activity    Alcohol use: No    Drug use: No    Sexual activity: Yes     Partners: Female   Other Topics Concern    None   Social History Narrative    None     Social Determinants of Health     Financial Resource Strain:     Difficulty of Paying Living Expenses: Not on file   Food Insecurity:     Worried About Running Out of Food in the Last Year: Not on file    [place] of Food in the Last Year: Not on file   Transportation Needs:     Lack of Transportation (Medical):  Not on file    Lack of Transportation (Non-Medical): Not on file   Physical Activity:     Days of Exercise per Week: Not on file    Minutes of Exercise per Session: Not on file   Stress:     Feeling of Stress : Not on file   Social Connections:     Frequency of Communication with Friends and Family: Not on file    Frequency of Social Gatherings with Friends and Family: Not on file    Attends Confucianism Services: Not on file    Active Member of [address] [place] [address] or Organizations: Not on file    Attends Club or Organization Meetings: Not on file    Marital Status: Not on file   Intimate Partner Violence:     Fear of Current or Ex-Partner: Not on file    Emotionally Abused: Not on file    Physically Abused: Not on file    Sexually Abused: Not on file   Housing Stability:     Unable to Pay for Housing in the Last Year: Not on file    Number of [place] in the Last Year: Not on file    Unstable Housing in the Last Year: Not on file         PHYSICAL EXAM    (up to 7 for level 4, 8 or more for level 5)     ED Triage Vitals [12/11/21 1046]   BP Temp Temp Source Pulse Resp SpO2 Height Weight   (!) 144/94 98.1 °F (36.7 °C) Oral 117 24 100 % 5' 10\" (1.778 m) 210 lb (95.3 kg)       Physical Exam  Vitals and nursing note reviewed. Constitutional:       General: He is not in acute distress. Appearance: He is well-developed. HENT:      Head: Normocephalic and atraumatic. Right Ear: External ear normal.      Left Ear: External ear normal.   Eyes:      Conjunctiva/sclera: Conjunctivae normal.   Neck:      Vascular: No JVD. Trachea: No tracheal deviation. Cardiovascular:      Rate and Rhythm: Regular rhythm. Tachycardia present. Pulmonary:      Effort: Pulmonary effort is normal. No respiratory distress. Breath sounds: Normal breath sounds. No wheezing. Comments: Lungs clear to auscultation. Abdominal:      General: There is no distension. Palpations: Abdomen is soft. Tenderness: There is no abdominal tenderness.  There is no guarding or rebound. Comments: Abdomen soft nontender to palpation. Musculoskeletal:         General: No tenderness. Normal range of motion. Cervical back: Normal range of motion and neck supple. No rigidity or tenderness. Skin:     General: Skin is warm and dry. Neurological:      General: No focal deficit present. Mental Status: He is alert and oriented to person, place, and time. Cranial Nerves: No cranial nerve deficit. Comments: Alert and oriented. Normal mental status. Normal gait and power. Normal strength and sensation in all 4 extremities         DIAGNOSTIC RESULTS         RADIOLOGY:     Interpretation per the Radiologist below, if available at the time of this note:    XR CHEST PORTABLE   Final Result   Questionable patchy opacities in the right mid to lower lung which may   reflect airspace disease in the correct clinical setting. LABS:  Labs Reviewed   COMPREHENSIVE METABOLIC PANEL W/ REFLEX TO MG FOR LOW K - Abnormal; Notable for the following components:       Result Value    Glucose 111 (*)      (*)      (*)     All other components within normal limits    Narrative:     Performed at:  [facility]. [facility]  [address] [place], [place] [address] [facility]. [facility], [address] [place] [address]   Phone [phone]   CBC WITH AUTO DIFFERENTIAL    Narrative:     Performed at:  [facility]. [facility]  [address] [place], [place] [address] [facility]. [facility], [address] [place] [address]   Phone [phone]       All otherlabs were within normal range or not returned as of this dictation.     EMERGENCY DEPARTMENT COURSE and DIFFERENTIAL DIAGNOSIS/MDM:   Vitals:    Vitals:    12/11/21 1046 12/11/21 1220 12/11/21 1302   BP: (!) 144/94 (!) 158/90 130/74   Pulse: 117 99 94   Resp: 24 18 18   Temp: 98.1 °F (36.7 °C)  98.3 °F (36.8 °C)   TempSrc: Oral  Oral   SpO2: 100% 99%    Weight: 210 lb (95.3 kg)     Height: 5' 10\" (1.778 m)           MDM  Patient is initially tachycardic on arrival however after IV fluids his tachycardia does resolve to a normal sinus rhythm in the high 90s. Laboratory evaluation is unremarkable except for moderately elevated LFTs. I feel the patient is appropriate for Zofran, p.o. fluids, and rest at home. His oxygen saturation is 99 to 100% on room air he denies any significant shortness of breath. I do not suspect impending acute hypoxic respiratory failure at this time-patient strict ER return precautions for worsening shortness of breath or new symptoms. I have discussed the patient's elevated LFTs with him. I advised that he not take any Tylenol and follow-up with his primary care doctor within 1 week for a recheck. Patient expresses understanding and agreement with this plan. Procedures    FINAL IMPRESSION      1. COVID-19    2. Elevated LFTs          DISPOSITION/PLAN   DISPOSITION Decision To Discharge 12/11/2021 12:54:59 PM      PATIENT REFERRED TO:  Eugenie Ramos MD  [address] [address]  [phone]    In [facility]. [facility] Emergency Department  [address] [place] [address]  [phone]    If symptoms worsen      DISCHARGE MEDICATIONS:  Discharge Medication List as of 12/11/2021 12:55 PM      START taking these medications    Details   ondansetron (ZOFRAN ODT) 4 MG disintegrating tablet Take 1 tablet by mouth every 8 hours as needed for Nausea, Disp-10 tablet, R-0Normal                (Please note that portions of this note were completed with a voice recognition program.  Efforts were made to edit the dictations but occasionally words aremis-transcribed. )    Doron Galeana MD (electronically signed)  Attending Emergency Physician             Doron Galeana MD  12/11/21 6925

## 2021-12-11 NOTE — ED NOTES
Pt states + COVID dx last wk. States he has had decreased PO intake and believes that he is dehydrated. Turgur WNL, Resps even and unlab.  Pt alert and without further c/o's or s/s distress or discomfort     Malik Enrique RN  12/11/21 1168

## 2023-02-03 ENCOUNTER — HOSPITAL ENCOUNTER (EMERGENCY)
Age: 48
Discharge: HOME | End: 2023-02-04
Attending: EMERGENCY MEDICINE
Payer: COMMERCIAL

## 2023-02-03 DIAGNOSIS — J06.9 ACUTE UPPER RESPIRATORY INFECTION: Primary | ICD-10-CM

## 2023-02-03 PROCEDURE — 87635 SARS-COV-2 COVID-19 AMP PRB: CPT

## 2023-02-03 PROCEDURE — 99283 EMERGENCY DEPT VISIT LOW MDM: CPT

## 2023-02-03 PROCEDURE — 87804 INFLUENZA ASSAY W/OPTIC: CPT

## 2023-02-03 PROCEDURE — 6370000000 HC RX 637 (ALT 250 FOR IP): Performed by: EMERGENCY MEDICINE

## 2023-02-03 RX ORDER — PSEUDOEPHEDRINE HCL 30 MG
60 TABLET ORAL ONCE
Status: COMPLETED | OUTPATIENT
Start: 2023-02-04 | End: 2023-02-03

## 2023-02-03 RX ORDER — BENZONATATE 100 MG/1
100 CAPSULE ORAL ONCE
Status: COMPLETED | OUTPATIENT
Start: 2023-02-04 | End: 2023-02-03

## 2023-02-03 RX ORDER — IBUPROFEN 400 MG/1
800 TABLET ORAL ONCE
Status: COMPLETED | OUTPATIENT
Start: 2023-02-04 | End: 2023-02-03

## 2023-02-03 RX ADMIN — BENZONATATE 100 MG: 100 CAPSULE ORAL at 23:49

## 2023-02-03 RX ADMIN — PSEUDOEPHEDRINE HCL 60 MG: 30 TABLET, FILM COATED ORAL at 23:49

## 2023-02-03 RX ADMIN — IBUPROFEN 800 MG: 400 TABLET, FILM COATED ORAL at 23:49

## 2023-02-03 NOTE — Clinical Note
Jay Lance was seen and treated in our emergency department on 2/3/2023. He may return to work on 02/06/2023. If you have any questions or concerns, please don't hesitate to call.       Antoni Noe MD

## 2023-02-04 VITALS
TEMPERATURE: 98.2 F | OXYGEN SATURATION: 100 % | SYSTOLIC BLOOD PRESSURE: 154 MMHG | RESPIRATION RATE: 18 BRPM | HEART RATE: 94 BPM | DIASTOLIC BLOOD PRESSURE: 99 MMHG

## 2023-02-04 LAB
RAPID INFLUENZA  B AGN: NEGATIVE
RAPID INFLUENZA A AGN: NEGATIVE
SARS-COV-2, NAAT: NOT DETECTED

## 2023-02-04 RX ORDER — BENZONATATE 100 MG/1
100 CAPSULE ORAL 3 TIMES DAILY PRN
Qty: 21 CAPSULE | Refills: 0 | Status: SHIPPED | OUTPATIENT
Start: 2023-02-04 | End: 2023-02-11

## 2023-02-04 RX ORDER — DICLOFENAC SODIUM 75 MG/1
75 TABLET, DELAYED RELEASE ORAL 2 TIMES DAILY PRN
Qty: 14 TABLET | Refills: 0 | Status: SHIPPED | OUTPATIENT
Start: 2023-02-04 | End: 2023-02-11

## 2023-02-04 ASSESSMENT — PAIN - FUNCTIONAL ASSESSMENT: PAIN_FUNCTIONAL_ASSESSMENT: NONE - DENIES PAIN

## 2023-02-04 NOTE — ED PROVIDER NOTES
Emergency Department Provider Note  Location: [facility] EMERGENCY DEPARTMENT  2/3/2023     Patient Identification  Rigoberto Gonzales is a 49 y.o. male    Chief Complaint  Cough (Cough x 3 days)      Mode of Arrival  private car    HPI  (History provided by patient)  This is a 49 y.o. male presented today for cough x 3 days. He also has nasal congestion and headache. He can feel himself wheezing. Cough is productive of clear/white phlegm. Denies fever. No body ache. He has diarrhea that started yesterday but improved today. He said he is able to keep water down. Denies stiff neck. No rash. No other complaints, modifying factors or associated symptoms. I have reviewed the following nursing documentation:  Allergies: Allergies   Allergen Reactions    Pcn [Penicillins] Hives       Past medical history:  has a past medical history of GERD (gastroesophageal reflux disease) and Perforated appendicitis. Past surgical history:  has a past surgical history that includes Appendectomy (3/2/2016). Home medications:   Prior to Admission medications    Medication Sig Start Date End Date Taking? Authorizing Provider   benzonatate (TESSALON PERLES) 100 MG capsule Take 1 capsule by mouth 3 times daily as needed for Cough 2/4/23 2/11/23 Yes Steffany Benton MD   diclofenac (VOLTAREN) 75 MG EC tablet Take 1 tablet by mouth 2 times daily as needed for Pain 2/4/23 2/11/23 Yes Steffany Benton MD   ondansetron (ZOFRAN ODT) 4 MG disintegrating tablet Take 1 tablet by mouth every 8 hours as needed for Nausea 12/11/21   Emeterio Baldwin MD   omeprazole (PRILOSEC) 20 MG capsule Take 20 mg by mouth daily    Historical Provider, MD       Social history:  reports that he has never smoked. He has never used smokeless tobacco. He reports that he does not drink alcohol and does not use drugs.     Family history:    Family History   Problem Relation Age of Onset    Cancer Mother     Heart Disease Mother        Exam  ED Triage Vitals [02/03/23 2331]   BP Temp Temp src Heart Rate Resp SpO2 Height Weight   (!) 148/91 98.2 °F (36.8 °C) -- (!) 104 16 95 % -- --   Nursing note and vital signs reviewed  Constitutional: Patient is oriented to person, place, and time. WDWN. No distress. Nontoxic. HENT:      Head: Normocephalic and atraumatic. Ears: External ears normal. TM normal bilaterally. Nose: Nose normal.     Mouth: Membrane mucosa moist and pink. Uvula midline. Soft palate symmetrical.  No evidence of PTA. No tonsillar exudate. No trismus. No submandibular fullness or tongue elevation. Eyes: Anicteric sclera. PERRL. EOMI. No discharge. Neck: Supple. Trachea midline. Good ROM. No meningismus signs. No mass. Lymph: No cervical adenopathy  Cardiovascular: RRR, no g/r/m. 2+ radial pulses. Pulmonary/Chest: Effort normal. No respiratory distress. CTAB. No stridor. No wheezes. No rales. Musculoskeletal: No extremity edema. Compartments soft. No deformity. Neurological: Alert and oriented to person, place, and time. No facial droop. No slurred speech. Normal gait. Skin: Warm and dry. No rash. No petechiae. MDM/ED Course  Labs  Results for orders placed or performed during the hospital encounter of 02/03/23   COVID-19, Rapid    Specimen: Nasopharyngeal Swab   Result Value Ref Range    SARS-CoV-2, NAAT Not Detected Not Detected   Rapid influenza A/B antigens    Specimen: Nasopharyngeal   Result Value Ref Range    Rapid Influenza A Ag Negative Negative    Rapid Influenza B Ag Negative Negative         - Patient seen and evaluated in room 6.  49 y.o. male presented for acute URI symptoms x 3 days. Exam showed well-developed well-nourished male with normal temperature, heart rate, and O2 sat. Lungs clear on exam.  No concern for sepsis or impending airway issues. Patient is well hydrated on exam.  Index of suspicion for severe dehydration is low.   - Patient was placed on telemetry during his/her ED stay and no malignant dysrhythmia observed. - Patient was given    ED Medication Orders (From admission, onward)      Start Ordered     Status Ordering Provider    02/04/23 0000 02/03/23 2341  benzonatate (TESSALON) capsule 100 mg  ONCE         Last MAR action: [facility] - [facility] Miguel MERINO [facility]03[facility]    02/04/23 0000 02/03/23 2341  pseudoephedrine (SUDAFED) tablet 60 mg  ONCE         Last MAR action: Given - by Miguel MERINO on 02/03/23 at [address] [facility]    02/04/23 0000 02/03/23 2341  ibuprofen (ADVIL;MOTRIN) tablet 800 mg  ONCE         Last MAR action: Given - by Riley Fuontain on 02/03/23 at [address] [facility]            Upon reassessment, Dario Quick said he felt slightly better. - Lab: Rapid flu and COVID-negative. - I discussed the results with patient. We agreed to discharge home with symptomatic relief for acute URI.   - Return precautions also discussed. patient verbalized understanding of care plan and agreed to follow-up with PCP as advised. - Is this patient to be included in the SEP-1 Core Measure due to severe sepsis or septic shock? No   Exclusion criteria - the patient is NOT to be included for SEP-1 Core Measure due to:  2+ SIRS criteria are not met    Clinical Impression:  1. Acute upper respiratory infection          Disposition:  Discharge to home in good condition. Blood pressure (!) 154/99, pulse 94, temperature 98.2 °F (36.8 °C), resp. rate 18, SpO2 100 %. Patient was given scripts for the following medications. I counseled patient how to take these medications.    Discharge Medication List as of 2/4/2023 12:32 AM        START taking these medications    Details   benzonatate (TESSALON PERLES) 100 MG capsule Take 1 capsule by mouth 3 times daily as needed for Cough, Disp-21 capsule, R-0Normal      diclofenac (VOLTAREN) 75 MG EC tablet Take 1 tablet by mouth 2 times daily as needed for Pain, Disp-14 tablet, R-0Normal Discharge Medication List as of 2/4/2023 12:32 AM          Disposition referral (if applicable):  Ashleigh Jarquin MD  [address] [place] [address]  [phone]    Schedule an appointment as soon as possible for a visit in 3 days      Marisol SCHMIDT am the primary attending of record and contributed the majority of evaluation and treatment of emergent care for this encounter. Total critical care time is 0 minutes, which excludes separately billable procedures and updating family. Time spent is specifically for management of the presenting complaint and symptoms initially, direct bedside care, reevaluation, review of records, and consultation. There was a high probability of clinically significant life-threatening deterioration in the patient's condition, which required my urgent intervention. This chart was generated in part by using Dragon Dictation system and may contain errors related to that system including errors in grammar, punctuation, and spelling, as well as words and phrases that may be inappropriate. If there are any questions or concerns please feel free to contact the dictating provider for clarification.      Marisol Thibodeaux MD  [address] [facility] Estefani Blum MD  02/04/23 4645